# Patient Record
Sex: FEMALE | Race: WHITE
[De-identification: names, ages, dates, MRNs, and addresses within clinical notes are randomized per-mention and may not be internally consistent; named-entity substitution may affect disease eponyms.]

---

## 2018-01-22 ENCOUNTER — HOSPITAL ENCOUNTER (INPATIENT)
Dept: HOSPITAL 12 - SRC | Age: 68
LOS: 6 days | Discharge: OTHER INSTITUTION | DRG: 895 | End: 2018-01-28
Attending: INTERNAL MEDICINE | Admitting: INTERNAL MEDICINE
Payer: COMMERCIAL

## 2018-01-22 VITALS — DIASTOLIC BLOOD PRESSURE: 65 MMHG | SYSTOLIC BLOOD PRESSURE: 141 MMHG

## 2018-01-22 VITALS — DIASTOLIC BLOOD PRESSURE: 56 MMHG | SYSTOLIC BLOOD PRESSURE: 104 MMHG

## 2018-01-22 VITALS — BODY MASS INDEX: 25.3 KG/M2 | HEIGHT: 61 IN | WEIGHT: 134 LBS

## 2018-01-22 DIAGNOSIS — G89.29: ICD-10-CM

## 2018-01-22 DIAGNOSIS — C44.92: ICD-10-CM

## 2018-01-22 DIAGNOSIS — D69.6: ICD-10-CM

## 2018-01-22 DIAGNOSIS — M17.0: ICD-10-CM

## 2018-01-22 DIAGNOSIS — Z90.49: ICD-10-CM

## 2018-01-22 DIAGNOSIS — Z81.8: ICD-10-CM

## 2018-01-22 DIAGNOSIS — E83.42: ICD-10-CM

## 2018-01-22 DIAGNOSIS — M54.5: ICD-10-CM

## 2018-01-22 DIAGNOSIS — F33.2: ICD-10-CM

## 2018-01-22 DIAGNOSIS — Z79.899: ICD-10-CM

## 2018-01-22 DIAGNOSIS — Z91.5: ICD-10-CM

## 2018-01-22 DIAGNOSIS — F51.4: ICD-10-CM

## 2018-01-22 DIAGNOSIS — Z96.651: ICD-10-CM

## 2018-01-22 DIAGNOSIS — F10.232: Primary | ICD-10-CM

## 2018-01-22 DIAGNOSIS — Z86.718: ICD-10-CM

## 2018-01-22 DIAGNOSIS — E07.81: ICD-10-CM

## 2018-01-22 DIAGNOSIS — Z81.1: ICD-10-CM

## 2018-01-22 DIAGNOSIS — C44.91: ICD-10-CM

## 2018-01-22 DIAGNOSIS — E87.3: ICD-10-CM

## 2018-01-22 DIAGNOSIS — I15.9: ICD-10-CM

## 2018-01-22 DIAGNOSIS — G47.50: ICD-10-CM

## 2018-01-22 DIAGNOSIS — E87.1: ICD-10-CM

## 2018-01-22 DIAGNOSIS — E86.0: ICD-10-CM

## 2018-01-22 LAB
ALP SERPL-CCNC: 77 U/L (ref 50–136)
ALT SERPL W/O P-5'-P-CCNC: 44 U/L (ref 14–59)
AMPHETAMINES UR QL SCN>1000 NG/ML: NEGATIVE
AMYLASE SERPL-CCNC: 68 U/L (ref 25–115)
AST SERPL-CCNC: 30 U/L (ref 15–37)
BARBITURATES UR QL SCN: NEGATIVE
BASOPHILS # BLD AUTO: 0 K/UL (ref 0–8)
BASOPHILS NFR BLD AUTO: 0.5 % (ref 0–2)
BILIRUB SERPL-MCNC: 0.4 MG/DL (ref 0.2–1)
BUN SERPL-MCNC: 31 MG/DL (ref 7–18)
CHLORIDE SERPL-SCNC: 99 MMOL/L (ref 98–107)
CO2 SERPL-SCNC: 34 MMOL/L (ref 21–32)
COCAINE UR QL SCN: NEGATIVE
CREAT SERPL-MCNC: 1.2 MG/DL (ref 0.6–1.3)
EOSINOPHIL # BLD AUTO: 0.3 K/UL (ref 0–0.7)
EOSINOPHIL NFR BLD AUTO: 4 % (ref 0–7)
ETHANOL SERPL-MCNC: < 3 MG/DL (ref 0–0)
GLUCOSE SERPL-MCNC: 116 MG/DL (ref 74–106)
HCG UR QL: NEGATIVE
HCT VFR BLD AUTO: 37 % (ref 31.2–41.9)
HGB BLD-MCNC: 12.6 G/DL (ref 10.9–14.3)
LYMPHOCYTES # BLD AUTO: 2.4 K/UL (ref 20–40)
LYMPHOCYTES NFR BLD AUTO: 31.3 % (ref 20.5–51.5)
MAGNESIUM SERPL-MCNC: 2 MG/DL (ref 1.8–2.4)
MCH RBC QN AUTO: 30.9 UUG (ref 24.7–32.8)
MCHC RBC AUTO-ENTMCNC: 34 G/DL (ref 32.3–35.6)
MCV RBC AUTO: 91.2 FL (ref 75.5–95.3)
MONOCYTES # BLD AUTO: 0.9 K/UL (ref 2–10)
MONOCYTES NFR BLD AUTO: 11.8 % (ref 0–11)
NEUTROPHILS # BLD AUTO: 4.1 K/UL (ref 1.8–8.9)
NEUTROPHILS NFR BLD AUTO: 52.4 % (ref 38.5–71.5)
OPIATES UR QL SCN: NEGATIVE
PCP UR QL SCN>25 NG/ML: NEGATIVE
PLATELET # BLD AUTO: 178 K/UL (ref 179–408)
POTASSIUM SERPL-SCNC: 4.8 MMOL/L (ref 3.5–5.1)
RBC # BLD AUTO: 4.06 MIL/UL (ref 3.63–4.92)
THC UR QL SCN>50 NG/ML: NEGATIVE
TSH SERPL DL<=0.005 MIU/L-ACNC: 4.33 MIU/ML (ref 0.36–3.74)
WBC # BLD AUTO: 7.8 K/UL (ref 3.8–11.8)
WS STN SPEC: 6.9 G/DL (ref 6.4–8.2)

## 2018-01-22 PROCEDURE — A4663 DIALYSIS BLOOD PRESSURE CUFF: HCPCS

## 2018-01-22 PROCEDURE — G0480 DRUG TEST DEF 1-7 CLASSES: HCPCS

## 2018-01-22 RX ADMIN — LORAZEPAM SCH MG: 1 TABLET ORAL at 15:09

## 2018-01-22 RX ADMIN — LORAZEPAM SCH MG: 1 TABLET ORAL at 22:09

## 2018-01-22 RX ADMIN — BUPRENORPHINE HYDROCHLORIDE SCH MG: 2 TABLET SUBLINGUAL at 22:16

## 2018-01-22 RX ADMIN — BUPRENORPHINE HYDROCHLORIDE SCH MG: 2 TABLET SUBLINGUAL at 15:10

## 2018-01-22 NOTE — NUR
End Of Shift 

Report given, plan of care reviewed, Pt is in stable condition A&Ox4, VS monitored closely q 
4 hours. Withdrawal symptoms were closely monitored. Initial CIWA 4. Patient encouraged 
adequate PO fluid intake as tolerated. Patient presented with tremors and anxiety during the 
day. Last COWS 5. Per patient, Ativan has been helping her with her withdrawal symptoms. Pt 
ate all of her meals.  No PRN medications given during the day. Patient encouraged to attend 
group therapies/sessions to learn new coping skills to recent relapse, patient denies SI/HI. 
Participated in group and therapy sessions. All needs met and attended

## 2018-01-22 NOTE — NUR
Pre admission note

Pt is a 67 year old female, admitted on 01/22/18 at 1400 for ETOH, Opiate , Dependence and 
medically supervised withdrawals. Pt in mildly intoxicated but is able comprehend and answer 
questions, pt is in stable condition A&Ox4 will continue assessment on unit.

## 2018-01-22 NOTE — NUR
ADMISSION NOTE

Pt is a 67 year old female, admitted on 01/22/18 at 1400 for ETOH, Opiate , Dependence and 
medically supervised withdrawals. Pts skin and body check completed, no contraband found, 
skin is warm dry and intact no wounds lesions or abnormalities noted upon assessment. Pt 
awake, alert, oriented x 4, gait steady, pt is ambulatory without assistance. Pt weights 134 
pounds and his height is 51. Denies any history of seizures. Pt escorted to room 307 where 
the rest of assessment was completed. Pt is primary source of information, consistent, 
speech coherent. Pt brought home medications which have been reconciled. Pt refused  PNA 
Vaccinations stating that she will receive it somewhere else. Pt has a PCP ,  and  
pt denies having a psychiatrist or a psychologist. Pt requested to be full code, regular  
diet on fall and seizure precautions, Pt denies any food or drug allergies. Her last BM was 
yesterday. Pt states that she lives in a house with her family. Pt denies smoking 
cigarettes. Pt denies being admitted to a hospital in the past 30 days, Pt is not a 
candidate for MRSA. Pts initial vital signs are BP: 141/65, Temp: 98.1, Pulse: 89 SPO2: 96% 
RR:18 and and she denies being in pain. Pts initial CIWA was a 4. Pt reports PMH of Anxiety 
Depression, knee replacement surgery, skin cancer, arthritis of BLE. Pts longest sobriety 
lasted 10 years months  which was from [id number]. Pt stated that she does not attend AA 
meetings. Pt reported family history of abuse, her grandfather drank vodka . Pt denies any 
suicidal or homicidal ideations. 

Substance use Hx:



1.ETOH  champagne: Pt reported first drinking when she was 14 years of age, she has been 
drinking a fifth  of champagne daily for the past 3 months, Her last use was today morning 
she drank a few glasses of champagne on the plane ride.

2.Suboxone:  24 mg daily for the past 8 month as prescribed by her MD.  



Rehab history:

None 



Pt cooperative, appears depressed, reports moderate anxiety. Educated on relaxation 
techniques (deep breathing) pt verbalized understanding.  Skin warm and moist from sweat, 
color pink, consistent throughout the body. Eyes PERLLA, tremors noted and felt. Pt denied 
tingling.  All extremities with full ROM. Lung sounds clear bilaterally, no cough present, 
pt denies SOB. Heart rate regular. Cap refill <3 sec. No edema noted. Abdomen soft, round, 
bowel sounds active x 4, non tender. Pt denies urinary difficulties, urine was provided and 
sent to lab. Pt oriented to unit, room, equipment, shown how to use call light, provided 
returned demonstration. Fall precautions and seizure in place. Side rails up x2, call light 
within reach, bed locked in low position. MD contacted and aware of patients condition.  All 
admitting orders have been placed.

## 2018-01-22 NOTE — NUR
START OF SHIFT NOTE 

RECEIVED REPORT FROM DAY SHIFT  NURSE. PATIENT IS A 67 YEAR OLD NEWLY ADMITTED FOR ALCOHOL 
DEPENDENCE. PATIENT WAS PLACED ON  MODIFIED ATIVAN TAPER. PATIENT  REPORTS PMH OF ANXIETY, 
DEPRESSION , KNEE REPLACEMENT , SKIN CANCER AND ARTHRITIS BLE. PATIENT IS ON SUBUTEX NO .  
TAPER, SHE WAS PRESCRIBED SUBOXONE  FOR SKIN CANCER AND ARTHRITIS BLE MAINTENANCE . NO 
SEIZURE HISTORY.  LAST CIWA 5. THIAMINE IM GIVEN.  RECEIVED PATIENT IN THE ROOM. PATIENT 
REPORTS ANXIETY, SWEATING, DRY MOUTH, SLIGHT TREMORS  AND  3/10  GENERALIZED BODY ACHES BUT 
TOLERABLE. SAFETY MEASURES IN PLACE. CALL LIGHT IN REACH. WILL CONTINUE TO MONITOR.

## 2018-01-23 VITALS — SYSTOLIC BLOOD PRESSURE: 92 MMHG | DIASTOLIC BLOOD PRESSURE: 53 MMHG

## 2018-01-23 VITALS — SYSTOLIC BLOOD PRESSURE: 94 MMHG | DIASTOLIC BLOOD PRESSURE: 54 MMHG

## 2018-01-23 VITALS — SYSTOLIC BLOOD PRESSURE: 101 MMHG | DIASTOLIC BLOOD PRESSURE: 52 MMHG

## 2018-01-23 VITALS — SYSTOLIC BLOOD PRESSURE: 97 MMHG | DIASTOLIC BLOOD PRESSURE: 53 MMHG

## 2018-01-23 VITALS — SYSTOLIC BLOOD PRESSURE: 81 MMHG | DIASTOLIC BLOOD PRESSURE: 56 MMHG

## 2018-01-23 VITALS — DIASTOLIC BLOOD PRESSURE: 52 MMHG | SYSTOLIC BLOOD PRESSURE: 102 MMHG

## 2018-01-23 RX ADMIN — THERA TABS SCH UDTAB: TAB at 09:01

## 2018-01-23 RX ADMIN — CITALOPRAM HYDROBROMIDE SCH MG: 20 TABLET, FILM COATED ORAL at 21:40

## 2018-01-23 RX ADMIN — BUPRENORPHINE HYDROCHLORIDE SCH MG: 2 TABLET SUBLINGUAL at 08:59

## 2018-01-23 RX ADMIN — ENOXAPARIN SODIUM SCH MG: 40 INJECTION SUBCUTANEOUS at 09:04

## 2018-01-23 RX ADMIN — Medication SCH EA: at 09:15

## 2018-01-23 RX ADMIN — Medication PRN EA: at 13:10

## 2018-01-23 RX ADMIN — Medication SCH MG: at 09:01

## 2018-01-23 RX ADMIN — LORAZEPAM SCH MG: 1 TABLET ORAL at 15:08

## 2018-01-23 RX ADMIN — FOLIC ACID SCH MG: 1 TABLET ORAL at 09:01

## 2018-01-23 RX ADMIN — BUPRENORPHINE HYDROCHLORIDE SCH MG: 2 TABLET SUBLINGUAL at 15:08

## 2018-01-23 RX ADMIN — BUPRENORPHINE HYDROCHLORIDE SCH MG: 2 TABLET SUBLINGUAL at 21:40

## 2018-01-23 RX ADMIN — LORAZEPAM SCH MG: 1 TABLET ORAL at 21:40

## 2018-01-23 RX ADMIN — LORAZEPAM SCH MG: 1 TABLET ORAL at 09:01

## 2018-01-23 NOTE — NUR
Reassessment

Pt is in room asleep, Right Radial pulse 62, no respiratory depression, no s/sx of w/d,  no 
non-verbal clues of anxiety or agitation present, CIWA 1; Ativan is effective. Will cont to 
monitor the pt.

## 2018-01-23 NOTE — NUR
PRN Medication Administration-Ativan 

Pt is in room sitting on edge of bed with serve tremors observed, moderate anxiety and 
states that she has "fluctuating sweating", V/S stable, no dizziness, no hallucinations or 
delusions noted, CIWA 13; PRN Ativan 2mg given as ordered. Will reassess in 1H.

## 2018-01-23 NOTE — NUR
CIWA DEFERRED 

PATIENT SLEEPING. CIWA DEFERRED. RESPIRATION EVEN AND UNLABORED. SAFETY  MEASURES IN PLACE. 
CALL LIGHT IN REACH. WILL CONTINUE TO MONITOR.

## 2018-01-23 NOTE — NUR
End of Shift

Report to the night nurse: pt is a 66 y/o female here for Etoh r/t 2 bottles of liquor daily 
for 3 months; Modified Ativan taper ordered and started 1/22/18. Pt is a full code, NKA, 
regular diet, fall and seizure precautions ordered. HHx: Anxiety, depression, knee 
replacement, arthritis on BLE's and active Skin Cancer with scheduled Subutex ordered. Pt 
has high VTE so SCD applied as ordered. PRN Ativan 2mg given at 1315 with CIWA 13 r/t severe 
tremors, sweating, anxiety and agitation, then decreased CIWA 1 with slightly moist skin & 
Radial Pulse 62 during sleep with reassessment. V/S stable. PPD test done on Right FA. Last 
CIWA 16 and Dr. Mednez notified that scheduled Ativan and Subutex given as ordered. No 
hallucinations, delusions or suicidal ideations noted during my shift. No HA, or dizziness 
noted. Pt denies chest pain or SOB noted. BM x2 today, no N/V/D noted. New orders for Celexa 
& labs to tomorrow. Pt is currently asleep in room and no respiratory depression noted.  Pt 
is excused from group therapy and activities during my shift.

## 2018-01-23 NOTE — NUR
Start of shift note

Received report from day shift nurse.  Pt is a 66 yo female, A+Ox4, presenting to [facility] for ETOH dependence.  Pt has NKA, is on Full code status, and on Regular diet.  Pt 
is on Fall and Seizure precautions.  Pt has HX of Anxiety, Depression, Knee replacement, 
Arthritis, and skin cancer.  Pt is on modified Ativan taper, tolerated well.  No s/s of 
distress noted at this time.  Respirations even and unlabored.  Will continue to monitor.

## 2018-01-23 NOTE — NUR
Start of Shift

Report from the night nurse: pt is a 66 y/o female here for Etoh r/t 2 bottles of liquor 
daily for 3 months; Modified Ativan taper ordered and started 1/22/18. Pt is a full code, 
NKA, regular diet, fall and seizure precautions ordered. HHx: Anxiety, depression, knee 
replacement, arthritis on BLE's and active Skin Cancer with scheduled Subutex ordered.  V/S 
stable. Pt has high VTE so SCD applied as ordered. Last CIWA 5. No PRN given. Pt is asleep 
in room. Will cont. to monitor the pt.

## 2018-01-23 NOTE — NUR
END OF SHIFT NOTE 

PATIENT SLEPT 8 HOURS. FLUID INTAKE 500 ML. VOIDED X . NO BM. PATIENT IS A 67 YEAR OLD NEWLY 
ADMITTED FOR ALCOHOL DEPENDENCE. PATIENT WAS PLACED ON  MODIFIED ATIVAN TAPER., TOLERATED 
WELL AND NO ADVERSE REACTION. PATIENT REPORTS ANXIETY, SWEATING, DRY MOUTH, SLIGHT TREMORS  
AND  3/10  GENERALIZED BODY ACHES BUT TOLERABLE BEGINNING OF SHIFT. PATIENT DID NOT REQUIRE 
ANY PRN MEDICATION.  SAFETY MEASURES IN PLACE. CALL LIGHT IN REACH. WILL CONTINUE TO 
MONITOR. LAST CIWA 5.

## 2018-01-24 VITALS — SYSTOLIC BLOOD PRESSURE: 90 MMHG | DIASTOLIC BLOOD PRESSURE: 58 MMHG

## 2018-01-24 VITALS — DIASTOLIC BLOOD PRESSURE: 66 MMHG | SYSTOLIC BLOOD PRESSURE: 97 MMHG

## 2018-01-24 VITALS — SYSTOLIC BLOOD PRESSURE: 101 MMHG | DIASTOLIC BLOOD PRESSURE: 72 MMHG

## 2018-01-24 VITALS — DIASTOLIC BLOOD PRESSURE: 71 MMHG | SYSTOLIC BLOOD PRESSURE: 94 MMHG

## 2018-01-24 VITALS — DIASTOLIC BLOOD PRESSURE: 60 MMHG | SYSTOLIC BLOOD PRESSURE: 91 MMHG

## 2018-01-24 VITALS — DIASTOLIC BLOOD PRESSURE: 50 MMHG | SYSTOLIC BLOOD PRESSURE: 88 MMHG

## 2018-01-24 RX ADMIN — THERA TABS SCH UDTAB: TAB at 09:31

## 2018-01-24 RX ADMIN — BUPRENORPHINE HYDROCHLORIDE SCH MG: 2 TABLET SUBLINGUAL at 21:13

## 2018-01-24 RX ADMIN — Medication SCH EA: at 09:32

## 2018-01-24 RX ADMIN — GABAPENTIN SCH MG: 100 CAPSULE ORAL at 21:13

## 2018-01-24 RX ADMIN — ENOXAPARIN SODIUM SCH MG: 40 INJECTION SUBCUTANEOUS at 09:32

## 2018-01-24 RX ADMIN — LORAZEPAM SCH MG: 1 TABLET ORAL at 16:04

## 2018-01-24 RX ADMIN — CITALOPRAM HYDROBROMIDE SCH MG: 20 TABLET, FILM COATED ORAL at 21:13

## 2018-01-24 RX ADMIN — LORAZEPAM SCH MG: 1 TABLET ORAL at 13:13

## 2018-01-24 RX ADMIN — BUPRENORPHINE HYDROCHLORIDE SCH MG: 2 TABLET SUBLINGUAL at 15:11

## 2018-01-24 RX ADMIN — BUPRENORPHINE HYDROCHLORIDE SCH MG: 2 TABLET SUBLINGUAL at 09:32

## 2018-01-24 RX ADMIN — FOLIC ACID SCH MG: 1 TABLET ORAL at 09:31

## 2018-01-24 RX ADMIN — Medication SCH MG: at 09:31

## 2018-01-24 NOTE — NUR
END OF SHIFT NOTE

Patient cont with 5 days Ativan taper tolerating well. During shift patient did not receive 
any  PRN. Vital signs remained WNL. Encourage Po fluids as tolerated. Last CIWA score was 3 
@1600. Skin intact warm and dry to touch. All needs attended, Safety measures in place. 
Patient endorsed to night nurse in stable condition.

## 2018-01-24 NOTE — NUR
Start of shift note

Received report from day shift nurse.  Pt is a 68 yo female, A+Ox4, presenting to [facility] for ETOH dependence.  Pt has NKA, is on Full code status, and on Regular diet.  Pt 
is on Fall and Seizure precautions.  Pt has HX of Anxiety, Depression, Knee replacement, 
Arthritis, and skin cancer.  Pt is on modified Ativan taper, tolerated well.  No s/s of 
distress noted at this time.  Respirations even and unlabored.  Will continue to monitor.

## 2018-01-24 NOTE — NUR
End of Shift(Endorsement to Primary Nurse)

Writer provided report on 67 year old female admitted to [facility] on 1/22/18 for ETOH 
detoxification. Pt is a full code, regular diet and endorses NKDA. PMH to include anxiety, 
depression, skin cancer and arthritis. Pt also dependent on Suboxone, requiring a scheduled 
routine dosing of Subutex. Pt currently on Ativan taper and tolerating well. Last CIWA of 4, 
assessed at 1200t. No PRN medication on this shift. Pt has been isolative to room and 
withdrawn with staff. Makes needs known. Pleasant and polite with staff. Bed in low position 
with wheels locked and side rails up x2. All safety measures in place.

## 2018-01-24 NOTE — NUR
End of shift note

Pt is a 66 yo female, A+Ox4, presenting to [facility] for ETOH dependence.  Pt has 
NKA, is on Full code status, and on Regular diet.  Pt is on Fall and Seizure precautions.  
Pt has HX of Anxiety, Depression, Knee replacement, Arthritis, and skin cancer.  Pt is on 
modified Ativan taper, tolerated well.  No PRN's given throughout shift.  Pt slept for a 
total of 10 HRS.  Last CIWA: 3 @0400.  No s/s of distress noted at this time.  Respirations 
even and unlabored.  Will endorse to day shift nurse.

## 2018-01-24 NOTE — NUR
Start of Shift

Writer received report on 67 year old female admitted to [facility] on 1/22/18 for ETOH 
detoxification. Pt is a full code, regular diet and endorses NKDA. PMH to include anxiety, 
depression, skin cancer and arthritis. Pt also dependent on Suboxone, requiring a scheduled 
routine dosing of Subutex. Pt currently on Ativan taper and tolerating well. Last CIWA of 3, 
assessed at 0400 per report. No PRN medication on NOC. Writer encounters pt resting in bed 
with eyes closed and even and unlabored respirations, with even rise and fall of chest. Bed 
in low position with wheels locked and side rails up x2. All safety measures in place.

## 2018-01-25 VITALS — DIASTOLIC BLOOD PRESSURE: 68 MMHG | SYSTOLIC BLOOD PRESSURE: 95 MMHG

## 2018-01-25 VITALS — SYSTOLIC BLOOD PRESSURE: 111 MMHG | DIASTOLIC BLOOD PRESSURE: 65 MMHG

## 2018-01-25 VITALS — DIASTOLIC BLOOD PRESSURE: 64 MMHG | SYSTOLIC BLOOD PRESSURE: 91 MMHG

## 2018-01-25 VITALS — DIASTOLIC BLOOD PRESSURE: 64 MMHG | SYSTOLIC BLOOD PRESSURE: 120 MMHG

## 2018-01-25 VITALS — SYSTOLIC BLOOD PRESSURE: 106 MMHG | DIASTOLIC BLOOD PRESSURE: 72 MMHG

## 2018-01-25 VITALS — DIASTOLIC BLOOD PRESSURE: 52 MMHG | SYSTOLIC BLOOD PRESSURE: 91 MMHG

## 2018-01-25 LAB
BASOPHILS # BLD AUTO: 0.1 K/UL (ref 0–8)
BASOPHILS NFR BLD AUTO: 0.5 % (ref 0–2)
BUN SERPL-MCNC: 24 MG/DL (ref 7–18)
CHLORIDE SERPL-SCNC: 101 MMOL/L (ref 98–107)
CO2 SERPL-SCNC: 33 MMOL/L (ref 21–32)
CREAT SERPL-MCNC: 0.9 MG/DL (ref 0.6–1.3)
EOSINOPHIL # BLD AUTO: 0.4 K/UL (ref 0–0.7)
EOSINOPHIL NFR BLD AUTO: 4.1 % (ref 0–7)
GLUCOSE SERPL-MCNC: 81 MG/DL (ref 74–106)
HCT VFR BLD AUTO: 39.5 % (ref 31.2–41.9)
HGB BLD-MCNC: 13.3 G/DL (ref 10.9–14.3)
LYMPHOCYTES # BLD AUTO: 3 K/UL (ref 20–40)
LYMPHOCYTES NFR BLD AUTO: 27.9 % (ref 20.5–51.5)
MAGNESIUM SERPL-MCNC: 1.8 MG/DL (ref 1.8–2.4)
MCH RBC QN AUTO: 30.8 UUG (ref 24.7–32.8)
MCHC RBC AUTO-ENTMCNC: 34 G/DL (ref 32.3–35.6)
MCV RBC AUTO: 91.8 FL (ref 75.5–95.3)
MONOCYTES # BLD AUTO: 1 K/UL (ref 2–10)
MONOCYTES NFR BLD AUTO: 9.5 % (ref 0–11)
NEUTROPHILS # BLD AUTO: 6.2 K/UL (ref 1.8–8.9)
NEUTROPHILS NFR BLD AUTO: 58 % (ref 38.5–71.5)
PLATELET # BLD AUTO: 181 K/UL (ref 179–408)
POTASSIUM SERPL-SCNC: 4.8 MMOL/L (ref 3.5–5.1)
RBC # BLD AUTO: 4.31 MIL/UL (ref 3.63–4.92)
TSH SERPL DL<=0.005 MIU/L-ACNC: 1.53 MIU/ML (ref 0.36–3.74)
WBC # BLD AUTO: 10.6 K/UL (ref 3.8–11.8)

## 2018-01-25 RX ADMIN — ENOXAPARIN SODIUM SCH MG: 40 INJECTION SUBCUTANEOUS at 08:41

## 2018-01-25 RX ADMIN — BUPRENORPHINE HYDROCHLORIDE SCH MG: 2 TABLET SUBLINGUAL at 14:02

## 2018-01-25 RX ADMIN — PRAZOSIN HYDROCHLORIDE SCH MG: 1 CAPSULE ORAL at 20:05

## 2018-01-25 RX ADMIN — Medication SCH EA: at 08:42

## 2018-01-25 RX ADMIN — Medication PRN EA: at 08:36

## 2018-01-25 RX ADMIN — Medication SCH MG: at 08:37

## 2018-01-25 RX ADMIN — CITALOPRAM HYDROBROMIDE SCH MG: 20 TABLET, FILM COATED ORAL at 20:05

## 2018-01-25 RX ADMIN — GABAPENTIN SCH MG: 100 CAPSULE ORAL at 08:37

## 2018-01-25 RX ADMIN — BUPRENORPHINE HYDROCHLORIDE SCH MG: 2 TABLET SUBLINGUAL at 20:06

## 2018-01-25 RX ADMIN — FOLIC ACID SCH MG: 1 TABLET ORAL at 08:37

## 2018-01-25 RX ADMIN — THERA TABS SCH UDTAB: TAB at 08:37

## 2018-01-25 RX ADMIN — GABAPENTIN SCH MG: 300 CAPSULE ORAL at 20:06

## 2018-01-25 RX ADMIN — BUPRENORPHINE HYDROCHLORIDE SCH MG: 2 TABLET SUBLINGUAL at 08:37

## 2018-01-25 NOTE — NUR
Start of Shift Notes:

Received endorsement from night nurse. Patient is a 67 year old female admitted for ETOH and 
opiate dependence who was placed on a modified Ativan and Subutex taper as ordered. No 
adverse reactions noted. NKA. FULL CODE. Regular diet. On fall and seizure precautions. 
Alert and verbally responsive. Oriented x 4. Respirations even and unlabored. No SOB noted. 
Skin warm and dry to touch. Abdomen soft and non-distended. BS (+) in all 4 quadrants. No 
complains of N/V/D or constipation noted. Voids independently. Educated patient on her 
current plan of care for the day and her medication regimen. Encouraged oral fluid intake 
and encouraged group participation to learn new skills to prevent relapse.  Will continue to 
monitor throughout the day.

## 2018-01-25 NOTE — NUR
Biotine mouth spray given:

Patient requested for biotine mouth spray administration for dry mouth with immediate relief 
after each spray.

## 2018-01-25 NOTE — NUR
PRN Medication Reassessment 

Patient is noted in bed sleeping. Breathing even and non labored. No signs of restlessness 
or discomfort noted. PRN Benadryl noted to be effective. Will continue to monitor.

## 2018-01-25 NOTE — NUR
Start of Shift 

Patient Received. Patient is in her room, awake, alert and verbally responsive. Breathing 
even and non labored. Per endorsement, patient continues on a modified Ativan taper and 
continues on a maintenance dosing of Subutex for Chronic pain management. No PRN Medications 
administered. Patient noted to be isolative to room and refusing to attend group meetings 
due to feeling increased signs of withdrawal. Last noted CIWA 3. Will continue plan of care 
as ordered.

## 2018-01-25 NOTE — NUR
End of Shift Notes:

Patient continues to be on modified Ativan taper as ordered. No adverse reactions noted. 
Patient is tolerating taper well. On maintenance Subutex as ordered for chronic pain 
management.  VS monitored closely. No significant abnormalities noted. Withdrawal symptoms 
were closely monitored. Initial CIWA 5, patient presented with myalgia, anxiety, agitation, 
sweats and tremors. No S/I or H/I noted. No AV hallucinations noted. Last CIWA 3. Per 
patient, Ativan  has been effective in reducing her withdrawal symptoms. Compliant with care 
and treatment. Tends to be isolative and withdrawn. Group participation and socialization 
encouraged. All needs met and attended. Will continue to monitor closely.

## 2018-01-25 NOTE — NUR
PRN Medication Administration 

Patient is noted verbalizing inability of falling asleep. PRN Benadryl administered as per 
order. Will continue to monitor.

## 2018-01-25 NOTE — NUR
Therapist prompted client about group times. Client stated she does not feel well today but 
will try to attend tomorrow.

## 2018-01-25 NOTE — NUR
Subutex order clarification:

Clarified with Dr. Mendez that patient is on maintenance dose of Subutex for chronic pain 
management. Patient is not on a Subutex taper, however is on a 5-day Ativan taper that 
started on 1/22/2017.

-------------------------------------------------------------------------------

Addendum: 01/25/18 at 1022 by ROSIE SHABAZZ LVN

-------------------------------------------------------------------------------

Per Dr. Mendez,  no need to do COWS at this time.

## 2018-01-26 VITALS — SYSTOLIC BLOOD PRESSURE: 100 MMHG | DIASTOLIC BLOOD PRESSURE: 52 MMHG

## 2018-01-26 VITALS — SYSTOLIC BLOOD PRESSURE: 102 MMHG | DIASTOLIC BLOOD PRESSURE: 60 MMHG

## 2018-01-26 VITALS — SYSTOLIC BLOOD PRESSURE: 116 MMHG | DIASTOLIC BLOOD PRESSURE: 67 MMHG

## 2018-01-26 VITALS — DIASTOLIC BLOOD PRESSURE: 73 MMHG | SYSTOLIC BLOOD PRESSURE: 109 MMHG

## 2018-01-26 VITALS — DIASTOLIC BLOOD PRESSURE: 57 MMHG | SYSTOLIC BLOOD PRESSURE: 108 MMHG

## 2018-01-26 VITALS — SYSTOLIC BLOOD PRESSURE: 98 MMHG | DIASTOLIC BLOOD PRESSURE: 57 MMHG

## 2018-01-26 RX ADMIN — BUPRENORPHINE HYDROCHLORIDE SCH MG: 2 TABLET SUBLINGUAL at 14:09

## 2018-01-26 RX ADMIN — BUPRENORPHINE HYDROCHLORIDE SCH MG: 2 TABLET SUBLINGUAL at 08:49

## 2018-01-26 RX ADMIN — THERA TABS SCH UDTAB: TAB at 08:49

## 2018-01-26 RX ADMIN — FOLIC ACID SCH MG: 1 TABLET ORAL at 08:49

## 2018-01-26 RX ADMIN — CITALOPRAM HYDROBROMIDE SCH MG: 20 TABLET, FILM COATED ORAL at 20:07

## 2018-01-26 RX ADMIN — Medication SCH MG: at 08:49

## 2018-01-26 RX ADMIN — LORAZEPAM SCH MG: 1 TABLET ORAL at 08:49

## 2018-01-26 RX ADMIN — PRAZOSIN HYDROCHLORIDE SCH MG: 1 CAPSULE ORAL at 20:06

## 2018-01-26 RX ADMIN — LORAZEPAM SCH MG: 1 TABLET ORAL at 20:07

## 2018-01-26 RX ADMIN — ENOXAPARIN SODIUM SCH MG: 40 INJECTION SUBCUTANEOUS at 08:51

## 2018-01-26 RX ADMIN — BUPRENORPHINE HYDROCHLORIDE SCH MG: 2 TABLET SUBLINGUAL at 20:06

## 2018-01-26 RX ADMIN — GABAPENTIN SCH MG: 300 CAPSULE ORAL at 20:06

## 2018-01-26 RX ADMIN — GABAPENTIN SCH MG: 300 CAPSULE ORAL at 08:49

## 2018-01-26 RX ADMIN — Medication SCH EA: at 08:54

## 2018-01-26 NOTE — NUR
Start of Shift

Patient Received. Patient is noted in bed sleeping but easily aroused to verbal stimuli. 
Breathing even and non labored. Patient noted not attended last group meeting and patient 
verbalizes "I dont feel up to going right now." Allowed patient to verbalize feelings. 
Encouraged and offered support. Patient continues on a modified Ativan taper and continues 
on a maintenance dosing of Subutex for Chronic pain management. Per endorsement, No PRN 
Medications administered. Last noted CIWA 4. All needs attended to promptly. Will continue 
plan of care as ordered.

## 2018-01-26 NOTE — NUR
End of Shift Notes:

Patient continues to be on modified Ativan taper as ordered. No adverse reactions noted. 
Patient is tolerating taper well. On maintenance Subutex as ordered for chronic pain 
management.  VS monitored closely. No significant abnormalities noted. Withdrawal symptoms 
were closely monitored. Initial CIWA 9, patient presented with myalgia, anxiety, agitation, 
sweats and tremors. No S/I or H/I noted. No AV hallucinations noted. Last CIWA 4. Per 
patient, Ativan  has been effective in reducing her withdrawal symptoms. Compliant with care 
and treatment. Tends to be isolative and withdrawn. Group participation and socialization 
encouraged. All needs met and attended. Will continue to monitor

## 2018-01-26 NOTE — NUR
End of Shift 

Patient is in bed sleeping but easily aroused to verbal stimuli. Breathing even and non 
labored. Patient continues on a modified Ativan taper and continues on a maintenance dosing 
of Subutex for Chronic pain management. Patient received PRN Benadryl for sleep with 
medication noted to be effective. Patient noted to sleep a total of 9 hours. Last noted CIWA 
8. All needs attended to promptly. Will endorse to continue plan of care as ordered.

## 2018-01-26 NOTE — NUR
PRN Medication Administration

Patient is noted verbalizing inability of falling asleep. PRN Benadryl administered with 
routine medications. will continue to monitor.

## 2018-01-26 NOTE — NUR
Start of Shift Notes:

Received endorsement from night nurse. Patient is a 67 year old female admitted for ETOH who 
was placed on a modified Ativan taper and maintenance Subutex dosing for chronic pain 
management. No adverse reactions noted. NKA. FULL CODE. Regular diet. On fall and seizure 
precautions. Alert and verbally responsive. Oriented x 4. Respirations even and unlabored. 
No SOB noted. Skin warm and dry to touch. Abdomen soft and non-distended. BS (+) in all 4 
quadrants. No complains of N/V/D or constipation noted. Voids independently. Educated 
patient on her current plan of care for the day and her medication regimen. Encouraged oral 
fluid intake and encouraged group participation to learn new skills to prevent relapse.  
Will continue to monitor throughout the day.

## 2018-01-27 VITALS — DIASTOLIC BLOOD PRESSURE: 60 MMHG | SYSTOLIC BLOOD PRESSURE: 101 MMHG

## 2018-01-27 VITALS — SYSTOLIC BLOOD PRESSURE: 108 MMHG | DIASTOLIC BLOOD PRESSURE: 66 MMHG

## 2018-01-27 VITALS — DIASTOLIC BLOOD PRESSURE: 59 MMHG | SYSTOLIC BLOOD PRESSURE: 97 MMHG

## 2018-01-27 VITALS — DIASTOLIC BLOOD PRESSURE: 63 MMHG | SYSTOLIC BLOOD PRESSURE: 106 MMHG

## 2018-01-27 VITALS — DIASTOLIC BLOOD PRESSURE: 70 MMHG | SYSTOLIC BLOOD PRESSURE: 118 MMHG

## 2018-01-27 VITALS — DIASTOLIC BLOOD PRESSURE: 65 MMHG | SYSTOLIC BLOOD PRESSURE: 108 MMHG

## 2018-01-27 RX ADMIN — ENOXAPARIN SODIUM SCH MG: 40 INJECTION SUBCUTANEOUS at 09:06

## 2018-01-27 RX ADMIN — BUPRENORPHINE HYDROCHLORIDE SCH MG: 2 TABLET SUBLINGUAL at 14:00

## 2018-01-27 RX ADMIN — GABAPENTIN SCH MG: 300 CAPSULE ORAL at 09:05

## 2018-01-27 RX ADMIN — Medication SCH EA: at 09:10

## 2018-01-27 RX ADMIN — Medication PRN EA: at 21:29

## 2018-01-27 RX ADMIN — FOLIC ACID SCH MG: 1 TABLET ORAL at 09:05

## 2018-01-27 RX ADMIN — GABAPENTIN SCH MG: 300 CAPSULE ORAL at 21:27

## 2018-01-27 RX ADMIN — BUPRENORPHINE HYDROCHLORIDE SCH MG: 2 TABLET SUBLINGUAL at 21:28

## 2018-01-27 RX ADMIN — THERA TABS SCH UDTAB: TAB at 09:05

## 2018-01-27 RX ADMIN — CITALOPRAM HYDROBROMIDE SCH MG: 20 TABLET, FILM COATED ORAL at 21:27

## 2018-01-27 RX ADMIN — Medication SCH MG: at 09:05

## 2018-01-27 RX ADMIN — PRAZOSIN HYDROCHLORIDE SCH MG: 1 CAPSULE ORAL at 21:27

## 2018-01-27 RX ADMIN — BUPRENORPHINE HYDROCHLORIDE SCH MG: 2 TABLET SUBLINGUAL at 09:04

## 2018-01-27 NOTE — NUR
End of Shift 

Patient is in bed sleeping. Breathing even and non labored. No signs of restlessness or 
discomfort noted. Patient continues on a modified Ativan taper and continues on a 
maintenance dosing of Subutex for Chronic pain management. PRN Benadryl administered with 
medication noted to be effective. Patient slept a total of 10 hours. Patient noted to be non 
compliant with group and social activities. Will endorse to continue encourage and educate. 
Last noted CIWA 5. All needs attended to promptly. Will endorse to  continue plan of care as 
ordered.

## 2018-01-27 NOTE — NUR
START OF SHIFT NOTE 

RECEIVED REPORT FROM DAY SHIFT NURSE. PATIENT IS A 67 YEAR OLD FEMALE ADMITTED FOR ETOH 
DEPENDENCE. PATIENT COMPLETED  MODIFIED ATIVAN TAPER. PATIENT IS ON MAINTENANCE DOSING OF 
SUBUTEX FOR CHRONIC PAIN MANAGEMENT. PATIENT IS MEDICALLY CLEARED TO BE DISCHARGE TOMORROW. 
PATIENT DID NOT REQUIRE ANY PRN MEDICATION.  LAST COWS 2.  PATIENT COMPLIANT  WITH 
MEDICATIONS , PATIENT TENDS TO BE ISOLATIVE. RECEIVED PATIENT IN THE ROOM, PATIENT ALERT AND 
ORIENTED X 4. RESPIRATION EVEN AND UNLABORED. NO COMPLAINTS  AT THIS TIME. WILL CONTINUE TO 
MONITOR.

## 2018-01-28 VITALS — SYSTOLIC BLOOD PRESSURE: 127 MMHG | DIASTOLIC BLOOD PRESSURE: 71 MMHG

## 2018-01-28 RX ADMIN — GABAPENTIN SCH MG: 300 CAPSULE ORAL at 08:27

## 2018-01-28 RX ADMIN — Medication SCH MG: at 08:27

## 2018-01-28 RX ADMIN — Medication SCH EA: at 08:27

## 2018-01-28 RX ADMIN — BUPRENORPHINE HYDROCHLORIDE SCH MG: 2 TABLET SUBLINGUAL at 08:28

## 2018-01-28 RX ADMIN — ENOXAPARIN SODIUM SCH MG: 40 INJECTION SUBCUTANEOUS at 08:33

## 2018-01-28 RX ADMIN — THERA TABS SCH UDTAB: TAB at 08:27

## 2018-01-28 RX ADMIN — FOLIC ACID SCH MG: 1 TABLET ORAL at 08:27

## 2018-01-28 NOTE — NUR
DISCHARGE

Patient discharged off the unit at 0910, in stable condition, prior to discharge patient was 
provided with education and teaching regarding all discharge instructions with good verbal 
understanding. Patient VS WNL. no s/sx of withdrawal noted. Patient discharged to [facility], noted self motivated towards sobriety. Patients home medications, prescriptions and 
discharge instructions were placed in personal duffel bag. patient off the unit in able 
condition at 0910.

## 2018-01-28 NOTE — NUR
END OF SHIFT NOTE 

PATIENT SLEPT 8 HOURS. FLUID INTAKE 796 ML. VOIDED X 3. NO BM. PATIENT IS A 67 YEAR OLD 
FEMALE ADMITTED FOR ETOH DEPENDENCE. PATIENT COMPLETED  MODIFIED ATIVAN TAPER. PATIENT IS ON 
MAINTENANCE DOSING OF SUBUTEX FOR CHRONIC PAIN MANAGEMENT. PATIENT IS MEDICALLY CLEARED TO 
BE DISCHARGE TODAY. PATIENT IN THE ROOM MOST OF THE SHIFT.  PATIENT COMPLIANT  WITH 
MEDICATIONS. PATIENT WAS GIVEN HER PRN LATISSE SOLUTION AND BIOTINE.  SAFETY MEASURES IN 
PLACE. WILL CONTINUE TO MONITOR. LAST CIWA 1.

## 2018-01-28 NOTE — NUR
BEGINNING OF SHIFT

Patient endorsement report received from night shift nurse, all pertinent information 
discussed. Patient is a 67 year old female with admitting Dx: ETOH dependence. Patient 
completed modified Ativan taper  as ordered and continues on maintenance dosing of Subutex 
for chronic pain mgmt as per Dr. bustamante. Patient is scheduled to be discharged this 
morning. will educate patient regarding all discharge instructions, noted self motivated 
towards sobriety.  patient received   PRN: biotin and Latisse  during night shift.  patient 
slept for 8 hours, and last ciwa score of: 1. Patient received awake, alert and oriented x4, 
educated regarding plan of care for the day and medication regimen, with good verbal 
understanding. safety measures in place. call light kept with in reach. all needs met and 
rendered, call light kept with in reach, will continue to monitor closely.

## 2018-10-27 ENCOUNTER — HOSPITAL ENCOUNTER (INPATIENT)
Dept: HOSPITAL 12 - SRC | Age: 68
LOS: 6 days | Discharge: OTHER INSTITUTION | DRG: 895 | End: 2018-11-02
Attending: INTERNAL MEDICINE | Admitting: INTERNAL MEDICINE
Payer: COMMERCIAL

## 2018-10-27 VITALS — DIASTOLIC BLOOD PRESSURE: 60 MMHG | SYSTOLIC BLOOD PRESSURE: 143 MMHG

## 2018-10-27 VITALS — BODY MASS INDEX: 23.98 KG/M2 | HEIGHT: 61 IN | WEIGHT: 127 LBS

## 2018-10-27 DIAGNOSIS — E83.42: ICD-10-CM

## 2018-10-27 DIAGNOSIS — Z86.711: ICD-10-CM

## 2018-10-27 DIAGNOSIS — G89.29: ICD-10-CM

## 2018-10-27 DIAGNOSIS — I95.89: ICD-10-CM

## 2018-10-27 DIAGNOSIS — F32.9: ICD-10-CM

## 2018-10-27 DIAGNOSIS — M17.12: ICD-10-CM

## 2018-10-27 DIAGNOSIS — M47.9: ICD-10-CM

## 2018-10-27 DIAGNOSIS — M81.0: ICD-10-CM

## 2018-10-27 DIAGNOSIS — Z87.11: ICD-10-CM

## 2018-10-27 DIAGNOSIS — Z85.828: ICD-10-CM
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
## 2018-10-27 DIAGNOSIS — K21.9: ICD-10-CM

## 2018-10-27 DIAGNOSIS — Z79.899: ICD-10-CM

## 2018-10-27 DIAGNOSIS — F11.23: ICD-10-CM

## 2018-10-27 DIAGNOSIS — Z96.651: ICD-10-CM

## 2018-10-27 DIAGNOSIS — E86.0: ICD-10-CM

## 2018-10-27 DIAGNOSIS — F10.230: Primary | ICD-10-CM

## 2018-10-27 DIAGNOSIS — Z81.1: ICD-10-CM

## 2018-10-27 DIAGNOSIS — F12.90: ICD-10-CM

## 2018-10-27 DIAGNOSIS — Z90.49: ICD-10-CM

## 2018-10-27 DIAGNOSIS — I10: ICD-10-CM

## 2018-10-27 DIAGNOSIS — F43.10: ICD-10-CM

## 2018-10-27 LAB
ALP SERPL-CCNC: 89 U/L (ref 50–136)
ALT SERPL W/O P-5'-P-CCNC: 29 U/L (ref 14–59)
AMPHETAMINES UR QL SCN>1000 NG/ML: NEGATIVE
AMYLASE SERPL-CCNC: 70 U/L (ref 25–115)
AST SERPL-CCNC: 30 U/L (ref 15–37)
BARBITURATES UR QL SCN: NEGATIVE
BASOPHILS # BLD AUTO: 0 K/UL (ref 0–8)
BASOPHILS NFR BLD AUTO: 0.3 % (ref 0–2)
BILIRUB SERPL-MCNC: 0.4 MG/DL (ref 0.2–1)
BUN SERPL-MCNC: 20 MG/DL (ref 7–18)
CHLORIDE SERPL-SCNC: 103 MMOL/L (ref 98–107)
CO2 SERPL-SCNC: 26 MMOL/L (ref 21–32)
COCAINE UR QL SCN: NEGATIVE
CREAT SERPL-MCNC: 1 MG/DL (ref 0.6–1.3)
EOSINOPHIL # BLD AUTO: 0.4 K/UL (ref 0–0.7)
EOSINOPHIL NFR BLD AUTO: 5 % (ref 0–7)
ETHANOL SERPL-MCNC: < 3 MG/DL (ref 0–0)
GLUCOSE SERPL-MCNC: 93 MG/DL (ref 74–106)
HCG UR QL: NEGATIVE
HCT VFR BLD AUTO: 39.2 % (ref 31.2–41.9)
HGB BLD-MCNC: 13.5 G/DL (ref 10.9–14.3)
LIPASE SERPL-CCNC: 108 U/L (ref 73–393)
LYMPHOCYTES # BLD AUTO: 2.6 K/UL (ref 20–40)
LYMPHOCYTES NFR BLD AUTO: 31.2 % (ref 20.5–51.5)
MAGNESIUM SERPL-MCNC: 1.7 MG/DL (ref 1.8–2.4)
MCH RBC QN AUTO: 30.9 UUG (ref 24.7–32.8)
MCHC RBC AUTO-ENTMCNC: 35 G/DL (ref 32.3–35.6)
MCV RBC AUTO: 89.4 FL (ref 75.5–95.3)
MONOCYTES # BLD AUTO: 0.9 K/UL (ref 2–10)
MONOCYTES NFR BLD AUTO: 11.2 % (ref 0–11)
NEUTROPHILS # BLD AUTO: 4.4 K/UL (ref 1.8–8.9)
NEUTROPHILS NFR BLD AUTO: 52.3 % (ref 38.5–71.5)
OPIATES UR QL SCN: NEGATIVE
PCP UR QL SCN>25 NG/ML: NEGATIVE
PLATELET # BLD AUTO: 209 K/UL (ref 179–408)
POTASSIUM SERPL-SCNC: 4.2 MMOL/L (ref 3.5–5.1)
RBC # BLD AUTO: 4.38 MIL/UL (ref 3.63–4.92)
THC UR QL SCN>50 NG/ML: POSITIVE
WBC # BLD AUTO: 8.5 K/UL (ref 3.8–11.8)
WS STN SPEC: 7.1 G/DL (ref 6.4–8.2)

## 2018-10-27 PROCEDURE — HZ2ZZZZ DETOXIFICATION SERVICES FOR SUBSTANCE ABUSE TREATMENT: ICD-10-PCS | Performed by: INTERNAL MEDICINE

## 2018-10-27 PROCEDURE — G0480 DRUG TEST DEF 1-7 CLASSES: HCPCS

## 2018-10-27 RX ADMIN — BUPRENORPHINE HYDROCHLORIDE SCH MG: 2 TABLET SUBLINGUAL at 21:19

## 2018-10-27 RX ADMIN — DIAZEPAM PRN MG: 10 TABLET ORAL at 21:19

## 2018-10-27 NOTE — NUR
Pre-admission assessment

Pt 67 y/o female presenting for medically supervised withdrawal of alcohol (Champagne). Pt 
reports she drinks 750 ml a day for about 3 months. Pt reports she had 360 ml of champagne 
at 11:00 am today. She denies being intoxicated. She denies she is withdrawals, but does c/o 
mild anxiety, headache, mild nausea, she is tearful and has fine tremors. 

Pt reports NKA, and requests to be a FULL CODE.  Vitals- 147/79, , resp 18, T-98.6, 
oxygen saturation 95%. 

Pt reports suicide attempt 2 weeks ago. She took 65 Remeron pills. She did not go to a 
doctor or hospital. She only told her  after it happened. She denies c/o suicidal 
ideations at this time. Pt does have history of involuntary psychiatric admission about 10 
years ago for danger to self and depression.

Pt reports she takes Suboxone 8mg/2mg TID for chronic knee pain. She is concerned about not 
getting her medication tonight or while she is here. She also takes Zantac 150 mg QD, Celexa 
10mg QD, and Vistaril 25 mg PRN Q6H.

Endorsed to PM shift for admission.

## 2018-10-27 NOTE — NUR
ADMISSION NOTE



Pt arrived ambulatory  accompanied by a PCA at 1855.  Pt is a 68 year old female admitted on 
10/27/18 for medically supervised withdrawal from ETOH (Champagne). Pt is full code with 
NKA. She reports a PMHx of anxiety, depression, HTN, GERD, Squamous and basal cell skin 
cancer, osteoporosis and degenerative disc diagnosed 10 years ago. Pt noted to be alert and 
oriented x4. Speech is clear and audible. She is able to answer interview questions. Pt is 
not intoxicated and presents with withdrawal symptoms of anxiety, red tearful eyes, 
headache, nausea, flushed face, difficulty concentrating, chills, and sweats.

 

She denies a history of abuse. She has a PCP named Dr. Linwood Weir. She denies having a 
psychiatrist. She brought several home medications of Celexa 30mg, Remeron 30mg, 
Multivitamins,and Ranitidine 150mg. Medications have been reconciled.



She denies a history of withdrawal induced delirium, overdose or seizures. She reports a 
history of black outs from ETOH at least three times a week. She reports a recent history of 
suicide attempt 2 weeks ago. Pt states " I took about 60+ pills of Remeron and didn't tell 
anyone." She reports that she did not receive medical care. She reports a history of 5150 
ten years ago for depression.



Pt reports she started using ETOH when she was a teenager. " Back then I would drink here 
and there but now I need it to cope with my pain."

This is her second  time in treatment. She reports that she has had two attempts at 
sobriety.  She reports the last time she was sober was from January 2018-June 2018. She 
states " I left [facility] in January 2018,  went to treatment at [facility] for 4 weeks 
and I was able to stay sober for about 5 months. I relapsed in July." She reports her 
longest sobriety is for 10 years from [id number].  

She reports she wants to get sober because, "I'm using alcohol for my pain and that's not 
cool." She states her triggers for relapse and barrier for staying sober is "My knee pain. 
It's just so painful, I use alcohol to cope and deal with it. I had surgery on my right knee 
and now I'm scheduled to have surgery on my left knee." Pt reports she is here to get sober 
because "[facility] actually encouraged me to come back here after they found out I 
wasn't doing well."



Pt states her support system is " my , Cecilia." Pt reports after detox " 
I want to go back to [facility]."  She reports using substances has affected her life 
because " I'm miserable. I have this chronic pain that's causing me to drink." She denies 
any legal consequences from drinking.  She is a college graduate and lives with her 



She describes her current use as:

1. ETOH (champagne) 750mL daily for 3 months at this rate. Last drink: 360mL on 10/27/18 at 
11:00 am 

2. Suboxone 24 mg daily x1 year. Last dose: 8 mg on 10/27/18 at 0500. Pt states " I've been 
using Suboxone to manage my knee pain and I plan to continue taking it at the treatment 
center" 

3. Cannabinoids " I've been using CBD oil for about one week now to help manage my pain"

Pt describes her withdrawal symptoms as: " anxiety, nausea, headache, agitation, chills and 
sweats"



Upon assessment, heart rate is regular.  Denies chest pain or SOB.  PERRLA, breathing is 
even and unlabored, lung sounds clear.  Abdomen is soft and non-distended.  Bowel sounds 
present in all quadrants, last BM 10/27/18.  Pt reports that BM is regular.  Pt's skin is 
warm, dry and intact. Noted with right scab d/t a scratch on right wrist. No bleeding or 
drainage noted. MD aware of pt's admission. Pt is scheduled to start a 5 day Valium taper 
tomorrow (10/28/18). Pt oriented to room and unit.  Safety measures in place.  Will continue 
to monitor.

## 2018-10-27 NOTE — NUR
PRN VALIUM



Pt complains of anxiety, flushed face, fine tremors, and agitation. CIWA:15. PRN Valium 10 
mg administered as ordered. Will monitor effectiveness.

## 2018-10-27 NOTE — NUR
PRN VALIUM REASSESSMENT



PRN medication effective. Pt is lying in bed with eyes closed and is noted to be asleep. 
Breathing is even and unlabored, safety measures in place. Will continue to monitor.

## 2018-10-28 VITALS — SYSTOLIC BLOOD PRESSURE: 130 MMHG | DIASTOLIC BLOOD PRESSURE: 65 MMHG

## 2018-10-28 VITALS — SYSTOLIC BLOOD PRESSURE: 97 MMHG | DIASTOLIC BLOOD PRESSURE: 55 MMHG

## 2018-10-28 VITALS — DIASTOLIC BLOOD PRESSURE: 56 MMHG | SYSTOLIC BLOOD PRESSURE: 107 MMHG

## 2018-10-28 VITALS — SYSTOLIC BLOOD PRESSURE: 169 MMHG | DIASTOLIC BLOOD PRESSURE: 95 MMHG

## 2018-10-28 VITALS — DIASTOLIC BLOOD PRESSURE: 57 MMHG | SYSTOLIC BLOOD PRESSURE: 109 MMHG

## 2018-10-28 LAB — TSH SERPL DL<=0.005 MIU/L-ACNC: 2.29 MIU/ML (ref 0.36–3.74)

## 2018-10-28 RX ADMIN — FOLIC ACID SCH MG: 1 TABLET ORAL at 08:39

## 2018-10-28 RX ADMIN — DIAZEPAM SCH MG: 10 TABLET ORAL at 14:40

## 2018-10-28 RX ADMIN — PRAZOSIN HYDROCHLORIDE SCH MG: 1 CAPSULE ORAL at 20:49

## 2018-10-28 RX ADMIN — DIAZEPAM SCH MG: 10 TABLET ORAL at 20:49

## 2018-10-28 RX ADMIN — BUPRENORPHINE HYDROCHLORIDE SCH MG: 2 TABLET SUBLINGUAL at 20:50

## 2018-10-28 RX ADMIN — DIAZEPAM PRN MG: 10 TABLET ORAL at 12:25

## 2018-10-28 RX ADMIN — THERA TABS SCH UDTAB: TAB at 08:40

## 2018-10-28 RX ADMIN — DIAZEPAM PRN MG: 10 TABLET ORAL at 07:28

## 2018-10-28 RX ADMIN — BUPRENORPHINE HYDROCHLORIDE SCH MG: 2 TABLET SUBLINGUAL at 14:40

## 2018-10-28 RX ADMIN — BUPRENORPHINE HYDROCHLORIDE SCH MG: 2 TABLET SUBLINGUAL at 08:40

## 2018-10-28 RX ADMIN — Medication SCH MG: at 08:39

## 2018-10-28 NOTE — NUR
PRN/ CIWA 16

PRN Valium 10mg PO given for CIWA 16, patient presents with increased anxiety, irritability, 
restlessness and decreased appetite

## 2018-10-28 NOTE — NUR
START OF SHIFT NOTE



Rcvd report form outgoing nurse. Pt is a 69 y/o female A/O to person, place, time, and 
purpose. Pt was admitted for medically supervised withdrawal from ETOH. Pt is on day 1 of a 
5 day Valium taper. Pt is also on maintenance Subutex 8mg TID. Pt has been presenting w/ 
sweats, chills, body aches, tremors, anxiety, depressed and withdrawn mood, flat affect, and 
anxiety. PRN Valium 10mg x2 was given and noted effective by outgoing nurse. Last CIWA 14 
and COWS 8 @ 1600. Call light is within reach. Pt will continue to be monitored and needs  
met.

## 2018-10-28 NOTE — NUR
PRN Valium Reassess

patient is lying in bed reading quietly, she states Valium 10mg PO PRN was effective in 
reducing her anxiety.

## 2018-10-28 NOTE — NUR
Start Of Shift

Patient is a 68 yr old female who was admitted to [facility] on 10/27/18 for a medically 
supervised withdrawal from ETOH , she will start a 5 day Valium taper today, she slept for 8 
hours and last CIWA was 15. Currently she is in bed awake and crying, states she feels so so 
anxious and in a state of almost panic, Valium 10mg PO PRN will be given, CIWA 17, scheduled 
10mg PO Valium will start at 0900 this AM. Patient states she is hungry, string cheese and 
pudding brought to patient, reassurance  and encouragement given.Continue to follow MD plan 
of care.

## 2018-10-28 NOTE — NUR
CIWA DEFERRED



Pt lying in bed with eyes closed and is noted to be asleep. Safety measures in place. Will 
monitor.

## 2018-10-28 NOTE — NUR
END OF SHIFT



Pt is a 68 year old female admitted on 10/27/18 for medically supervised withdrawal from 
ETOH. Pt noted to be alert and oriented x4. She was noted with anxiety, flsuhed face, 
restlessness, agitation, red teary eyes, headache and nausea during the shift. She is 
scheduled to start a 5 day Valium taper today. She did not receive or request PRN 
medications. She slept a total of 8 hrs, Intake: 500mL, Void: x1, BM:0, CIWA:15 at 2100. 
Breathing even and unlabroed, safety measures in place. Endorsed to AM shift.

## 2018-10-28 NOTE — NUR
COWS 8/ CIWA 14

Withdrawal symptoms include: chills/diaphoresis, myalgias and irritability, increased 
anxiety and emotional volatility

## 2018-10-28 NOTE — NUR
PRN Valium

Valium 10mg PO given, patient is crying, trembling, states a sense of panic, CIWA 17

Scheduled 10mg PO Valium will also be given @ 0900

## 2018-10-28 NOTE — NUR
End Of Shift        

Patient is a 68 yr old female who was admitted to [facility] on 10/27/18 for a medically 
supervised withdrawal from ETOH , she started a 5 day Valium taper today and continues on a 
maintenance dose of 24mg SL Subutex per day . PRN medications given on this shift : Valium 
10mg PO x2. Her withdrawal symptoms include restlessness, increased anxiety, tearfulness, 
body aches -specifically left knee. She had a fluid intake of 2387 ML,2 Voids and 0 BM, her 
last COWS was 8 and  CIWA was 14 @ 1600. She has not joined in group today but has isolated 
in her room reading a book. SCD's on for DVT prophylaxis. Continue to follow MD plan of care 
and offer support and encouragement. Endorsed to night shift.

## 2018-10-28 NOTE — NUR
VITALS REFUSED, CIWA DEFERRED



0400 vitals refused. CIWA deferred d/t pt lying in bed with eyes closed noted to be asleep. 
Breathing is even and unlabored, safety measures in place. Will monitor.

## 2018-10-28 NOTE — NUR
CIWA AND COWS ASSESSMENT



CIWA 14 and COWS 8. Pt has been presenting w/ sweats, chills, body aches, tremors, anxiety, 
depressed and withdrawn mood, flat affect, and anxiety. V/S: T:98.2, P:93, RR:14, SPO2:99, 
BP:169/95.

## 2018-10-28 NOTE — NUR
CIWA 15

Patient presents with increased anxiety, tearfulness, knee and right side pain,she has a sad 
depressed affect

Scheduled Valium 10mg PO and Subutex 8mg SL given

## 2018-10-28 NOTE — NUR
COWS 11

Patient presents with generalized body aches, increased anxiety and bilateral tremors

Subutex 8mg SL given

## 2018-10-29 VITALS — SYSTOLIC BLOOD PRESSURE: 91 MMHG | DIASTOLIC BLOOD PRESSURE: 55 MMHG

## 2018-10-29 VITALS — DIASTOLIC BLOOD PRESSURE: 59 MMHG | SYSTOLIC BLOOD PRESSURE: 101 MMHG

## 2018-10-29 VITALS — DIASTOLIC BLOOD PRESSURE: 47 MMHG | SYSTOLIC BLOOD PRESSURE: 89 MMHG

## 2018-10-29 VITALS — DIASTOLIC BLOOD PRESSURE: 50 MMHG | SYSTOLIC BLOOD PRESSURE: 94 MMHG

## 2018-10-29 LAB
BUN SERPL-MCNC: 26 MG/DL (ref 7–18)
CHLORIDE SERPL-SCNC: 104 MMOL/L (ref 98–107)
CO2 SERPL-SCNC: 28 MMOL/L (ref 21–32)
CREAT SERPL-MCNC: 1.2 MG/DL (ref 0.6–1.3)
GLUCOSE SERPL-MCNC: 87 MG/DL (ref 74–106)
MAGNESIUM SERPL-MCNC: 1.8 MG/DL (ref 1.8–2.4)
POTASSIUM SERPL-SCNC: 4.4 MMOL/L (ref 3.5–5.1)

## 2018-10-29 RX ADMIN — THERA TABS SCH UDTAB: TAB at 08:40

## 2018-10-29 RX ADMIN — DIAZEPAM SCH MG: 5 TABLET ORAL at 08:40

## 2018-10-29 RX ADMIN — BUPRENORPHINE HYDROCHLORIDE SCH MG: 2 TABLET SUBLINGUAL at 14:43

## 2018-10-29 RX ADMIN — FOLIC ACID SCH MG: 1 TABLET ORAL at 08:40

## 2018-10-29 RX ADMIN — Medication SCH MG: at 08:40

## 2018-10-29 RX ADMIN — DIAZEPAM SCH MG: 5 TABLET ORAL at 20:30

## 2018-10-29 RX ADMIN — PRAZOSIN HYDROCHLORIDE SCH MG: 1 CAPSULE ORAL at 20:30

## 2018-10-29 RX ADMIN — PANTOPRAZOLE SODIUM SCH MG: 40 TABLET, DELAYED RELEASE ORAL at 06:41

## 2018-10-29 RX ADMIN — DIAZEPAM SCH MG: 5 TABLET ORAL at 12:57

## 2018-10-29 RX ADMIN — DIAZEPAM SCH MG: 5 TABLET ORAL at 16:58

## 2018-10-29 RX ADMIN — BUPRENORPHINE HYDROCHLORIDE SCH MG: 2 TABLET SUBLINGUAL at 20:30

## 2018-10-29 RX ADMIN — BUPRENORPHINE HYDROCHLORIDE SCH MG: 2 TABLET SUBLINGUAL at 08:40

## 2018-10-29 NOTE — NUR
Start Of Shift

Patient is a 68 yr old female who was admitted to [facility] on 10/27/18 for a medically 
supervised withdrawal from ETOH , she has been placed on a 5 day Valium taper and today is 
day 2, she slept for 9 hours and last CIWA was 14 and COWS 8. Currently she is in bed 
asleep, breathing even and unlabored, call light within reach, SCD's on. Continue to follow 
MD plan of care and offer support and encouragement.

## 2018-10-29 NOTE — NUR
COWS 8/ CIWA 13

Withdrawal symptoms include: chills/diaphoresis, myalgias and irritability, increased 
anxiety and emotional volatility

## 2018-10-29 NOTE — NUR
End Of Shift       

Patient is a 68 yr old female who was admitted to [facility] on 10/27/18 for a medically 
supervised withdrawal from ETOH , she has been placed on a 5 day Valium taper  and continues 
on a maintenance dose of 24mg SL Subutex per day .No PRN medications were given on this 
shift . Her withdrawal symptoms include restlessness, increased anxiety, tearfulness, body 
aches -specifically left knee. She had a fluid intake of 1250  ML, 2 Voids and 0 BM, her 
last COWS was 8 and  CIWA was 13 @ 1600. She has not joined in group today but has isolated 
in her room reading a book. SCD's on for DVT prophylaxis. Continue to follow MD plan of care 
and offer support and encouragement. Endorsed to night shift.

## 2018-10-29 NOTE — NUR
START OF SHIFT NOTE



Rcvd report from outgoing nurse. Pt is a 67 y/o female A/O to person, place, time, and 
purpose. Pt was admitted for medically supervised withdrawal from ETOH. Pt is Subutex 
maintenance @ 8mg TID. Pt is on day 2 of a 5 day Valium taper. Pt has been presenting w/ 
anxiety, depressed and withdrawn mood, isolative, flat affect, sweats, chills, and body 
aches. Pt rcvd no PRN medications during previous shift. Last CIWA 13 and COWS 8 @ 1600. 
Call light is within reach. Pt will continue to be monitored and needs met.

## 2018-10-29 NOTE — NUR
CIWA AND COWS ASSESSMENT



CIWA 12 and COWS 8. Pt has been presenting w/ anxiety, depressed and withdrawn mood, 
isolative, flat affect, sweats, chills, tremors, and body aches. V/S: T:98.2, P:80, RR:15, 
SPO2:96, BP:101/59.

## 2018-10-29 NOTE — NUR
COWS 7/ CIWA 13

Withdrawal symptoms include: chills/diaphoresis, myalgias and irritability, increased 
anxiety and emotional volatility

Scheduled Valium 5mg PO given

## 2018-10-29 NOTE — NUR
CIWA AND COWS DEFERRED



Pt is in bed w/ her eyes closed. Pt's respirations are unlabored and even.

## 2018-10-29 NOTE — NUR
END OF SHIFT NOTE



Endorsed pt to oncoming nurse. Pt is a 67 y/o female A/O to person, place, time, and 
purpose. Pt was admitted for medically supervised withdrawal from ETOH. Pt completed day 1 
of a 5 day Valium taper. Pt is also on maintenance Subutex 8mg TID. Pt continues presenting 
w/ sweats, chills, body aches, tremors, anxiety, depressed and withdrawn mood, flat affect, 
and anxiety. Pt denies any S/I or H/I. Pt rcvd no PRN medications during shift.  Pts fluid 
intake was 1855ml and she slept for 9 hrs. Last CIWA 14 and COWS 8 @ [id number]. Call light is 
within reach.

## 2018-10-29 NOTE — NUR
CIWA AND COWS DEFERRED. V/S REFUSED



Pt is in bed w/ her eyes closed. Pt's respirations are unlabored and even.

## 2018-10-30 VITALS — DIASTOLIC BLOOD PRESSURE: 70 MMHG | SYSTOLIC BLOOD PRESSURE: 102 MMHG

## 2018-10-30 VITALS — DIASTOLIC BLOOD PRESSURE: 60 MMHG | SYSTOLIC BLOOD PRESSURE: 100 MMHG

## 2018-10-30 VITALS — DIASTOLIC BLOOD PRESSURE: 59 MMHG | SYSTOLIC BLOOD PRESSURE: 91 MMHG

## 2018-10-30 VITALS — SYSTOLIC BLOOD PRESSURE: 110 MMHG | DIASTOLIC BLOOD PRESSURE: 63 MMHG

## 2018-10-30 PROCEDURE — HZ31ZZZ INDIVIDUAL COUNSELING FOR SUBSTANCE ABUSE TREATMENT, BEHAVIORAL: ICD-10-PCS

## 2018-10-30 RX ADMIN — THERA TABS SCH UDTAB: TAB at 08:11

## 2018-10-30 RX ADMIN — DIAZEPAM SCH MG: 5 TABLET ORAL at 20:54

## 2018-10-30 RX ADMIN — Medication SCH MG: at 08:11

## 2018-10-30 RX ADMIN — BUPRENORPHINE HYDROCHLORIDE SCH MG: 2 TABLET SUBLINGUAL at 20:54

## 2018-10-30 RX ADMIN — DIAZEPAM SCH MG: 5 TABLET ORAL at 08:11

## 2018-10-30 RX ADMIN — BUPRENORPHINE HYDROCHLORIDE SCH MG: 2 TABLET SUBLINGUAL at 15:30

## 2018-10-30 RX ADMIN — PANTOPRAZOLE SODIUM SCH MG: 40 TABLET, DELAYED RELEASE ORAL at 06:48

## 2018-10-30 RX ADMIN — FOLIC ACID SCH MG: 1 TABLET ORAL at 08:11

## 2018-10-30 RX ADMIN — BUPRENORPHINE HYDROCHLORIDE SCH MG: 2 TABLET SUBLINGUAL at 08:11

## 2018-10-30 RX ADMIN — DIAZEPAM SCH MG: 5 TABLET ORAL at 15:30

## 2018-10-30 RX ADMIN — PRAZOSIN HYDROCHLORIDE SCH MG: 1 CAPSULE ORAL at 21:00

## 2018-10-30 NOTE — NUR
Minipress held

Minipress 1 mg PO due at 2100 was held due to the BP is 88/58. will continue to monitor

## 2018-10-30 NOTE — NUR
Start of Shift Note

Received a 67 y/o female px, admitted for medically supervised withdrawal from ETOH and 
Suboxone. Px is also using cannabinoids oil. Px was placed on 5 day Valium taper started on 
10/28/2018 and on Subutex maintenance 8 mg TID. She is tolerating it. Shes tolerating it. 
Last reported COWS 8 and CIWA 13 by AM shift nurse. During the round at 2000, px is awake on 
bed in left side lying position. Px appears anxious and depressed. Few drinks noted on top 
of the bed side table. She states that her anxiety is 5/10 and complains of knee pain and 
LBP of 5/10. Bed on lowest position, side rails up 2x and call light within reach. Well 
continue to monitor

## 2018-10-30 NOTE — NUR
START OF SHIFT

Endorse rcvd from ongoing nurse, client is in room, lying on her back, she sounds asleep, 
easy to arouse. RR 16, even, non-labored. Last CIWA 12 /COWS 8 @ 2000. Client slept 10 hrs. 
Side rails x 2 up/padded. Seizure precautions. Call light within reach. Will continue to 
monitor.

## 2018-10-30 NOTE — NUR
PRN Baclofen and Benadryl

Px received Baclofen 10 mg PO for knee pain and LBP and Benadryl 50 mg PO for insomnia. to 
reassess after an hour

## 2018-10-30 NOTE — NUR
CIWA DEFERRED. V/S REFUSED



Pt is in bed w/ his eyes closed. Pt's respirations are unlabored and even.

## 2018-10-30 NOTE — NUR
CIWA 13 / COWS 8

Client presents with anxious mood, flat affect, flushed facial skin, enlarged pupils, clammy 
skin, generalized body aches, and difficulty concentrating. Non-pharmacological measures 
rendered. Call light within reach.

## 2018-10-30 NOTE — NUR
CIWA 14 / COWS 8

Client is in bed, a/o x 4, She presents with anxious mood, flat affect, flushed facial skin, 
enlarged pupils, clammy skin, generalized body aches, and difficulty concentrating. Schedule 
Valium 5 mg PO, Subutex 4mg SL administered.  Call light within reach.

## 2018-10-30 NOTE — NUR
END OF SHIFT NOTE



Endorsed pt to oncoming nurse. Pt is a 67 y/o female A/O to person, place, time, and 
purpose. Pt was admitted for medically supervised withdrawal from ETOH. Pt is Subutex 
maintenance @ 8mg TID. Pt completed day 2 of a 5 day Valium taper. Pt continues presenting 
w/ anxiety, depressed and withdrawn mood, isolative, flat affect, sweats, chills, tremors, 
and body aches. Pt denies any S/I or H/I. Pt rcvd no PRN medications during shift. Pts 
fluid intake was 500ml and she slept for 10hrs. Last CIWA 12 and COWS 8 @ 2000. Call light 
is within reach.

## 2018-10-30 NOTE — NUR
COWS 8 and CIWA 9

Upon assessment, px appears anxious and depressed. She states that her anxiety is 5/10 and 
complains of knee pain and LBP of 5/10. NE= 93. will continue to monitor

## 2018-10-30 NOTE — NUR
CIWA 13 / COWS 8

Client presents with anxious mood, flat affect, flushed facial skin, enlarged pupils, clammy 
skin,  and difficulty concentrating. Client Valium 5mg PO and Subutex 8mg SL administered. 
Call light within reach.

## 2018-10-30 NOTE — NUR
END OF SHIFT 

Endorse client to incoming nurse, client is in room, a/o x 4, client continues to present 
with anxious mood, flat affect, flushed facial skin, enlarged pupils, clammy skin,  and 
difficulty concentrating. CIWA 13 / COWS 8 @ 1600. Client is on third of 5 day Valium taper, 
and maintenance Subutex 8mg SL TID. Client is not compliant with group therapy due to above 
withdrawal symptoms. Consumes 50% of meals. Adequate PO fluid intake 2355mL, void x 3. Side 
rails x 2 up/padded. Seizure precautions. Call light within reach.

## 2018-10-30 NOTE — NUR
PRN Benadryl and Baclofen reassessment

On assessment. px is still awake lying on bed in left side position. She states that her 
pain improved to 3/10

## 2018-10-30 NOTE — NUR
CIWA AND COWS DEFERRED. V/S REFUSED



Pt is in bed w/ her eyes cloesd. Pt's respirations are unlabored and even.

## 2018-10-30 NOTE — NUR
START OF SHIFT

Endorse rcvd from ongoing nurse, client is in room, lying on her back, she sounds asleep, 
easy to arouse. RR 16, even, non-labored. Last CIWA 12 /COWS 8 @ 2000. Client slept 10 hrs. 
Side rails x 2 up/padded. Seizure precautions. Call light within reach. Will continue to 
monitor.

-------------------------------------------------------------------------------

Addendum: 10/30/18 at 1910 by KASSANDRA MATTHEWS RN

-------------------------------------------------------------------------------

Wrong Time

## 2018-10-31 VITALS — SYSTOLIC BLOOD PRESSURE: 146 MMHG | DIASTOLIC BLOOD PRESSURE: 97 MMHG

## 2018-10-31 VITALS — DIASTOLIC BLOOD PRESSURE: 72 MMHG | SYSTOLIC BLOOD PRESSURE: 109 MMHG

## 2018-10-31 VITALS — SYSTOLIC BLOOD PRESSURE: 100 MMHG | DIASTOLIC BLOOD PRESSURE: 66 MMHG

## 2018-10-31 VITALS — DIASTOLIC BLOOD PRESSURE: 67 MMHG | SYSTOLIC BLOOD PRESSURE: 129 MMHG

## 2018-10-31 VITALS — DIASTOLIC BLOOD PRESSURE: 56 MMHG | SYSTOLIC BLOOD PRESSURE: 89 MMHG

## 2018-10-31 VITALS — SYSTOLIC BLOOD PRESSURE: 91 MMHG | DIASTOLIC BLOOD PRESSURE: 58 MMHG

## 2018-10-31 RX ADMIN — PANTOPRAZOLE SODIUM SCH MG: 40 TABLET, DELAYED RELEASE ORAL at 06:59

## 2018-10-31 RX ADMIN — PRAZOSIN HYDROCHLORIDE SCH MG: 1 CAPSULE ORAL at 20:44

## 2018-10-31 RX ADMIN — FOLIC ACID SCH MG: 1 TABLET ORAL at 08:50

## 2018-10-31 RX ADMIN — Medication SCH MG: at 08:50

## 2018-10-31 RX ADMIN — BUPRENORPHINE HYDROCHLORIDE SCH MG: 2 TABLET SUBLINGUAL at 08:51

## 2018-10-31 RX ADMIN — ONDANSETRON PRN MG: 4 TABLET, ORALLY DISINTEGRATING ORAL at 08:49

## 2018-10-31 RX ADMIN — BUPRENORPHINE HYDROCHLORIDE SCH MG: 2 TABLET SUBLINGUAL at 20:43

## 2018-10-31 RX ADMIN — DIAZEPAM SCH MG: 5 TABLET ORAL at 20:44

## 2018-10-31 RX ADMIN — THERA TABS SCH UDTAB: TAB at 08:50

## 2018-10-31 RX ADMIN — BUPRENORPHINE HYDROCHLORIDE SCH MG: 2 TABLET SUBLINGUAL at 14:31

## 2018-10-31 RX ADMIN — DIAZEPAM SCH MG: 5 TABLET ORAL at 08:50

## 2018-10-31 NOTE — NUR
end of shift note: pt is admitted to [facility] for etoh withdrawal/dependence. but remains on 
suboxone/subutex maintenance of 8 miligrams. pt is tolerating valium and subutex well, pt 
with episode of nausea and vomiting in the beginning of the shift, IM zofran was effective. 
pt was able to tolerate food intake afterwards. pt with increased activity throughout the 
shift  

-------------------------------------------------------------------------------

Addendum: 10/31/18 at 1934 by MARY ANN LABOY RN

-------------------------------------------------------------------------------

last cows 7 and ciwa 11

## 2018-10-31 NOTE — NUR
start of shift note: received pt from night shift nurse, pt is sleeping at this time no pain 
or discomfort, pt is admitted on subutex maintenance dose, and etoh withdrawal/dependence. 
pt's last ciwa 9. will continue to monitor pt for any changes and continue to meet pt's 
needs

## 2018-10-31 NOTE — NUR
PRN re-assessment: Zofran ODT was not effective, pt had 1 episode of emesis, will administer 
IM zofran and offer ginger Ale.

## 2018-10-31 NOTE — NUR
START OF SHIFT NOTE:

This report received for patient, 68 year old female, admitted on 10/27/2018 for medically 
supervised withdrawal from Alcohol (Champagne), Suboxone, and Cannabinoids (CBD oil).  
Patient continues ordered 5 Day Valium Taper started on 10/28/2018, and ordered Subutex 
maintenance - 8 mg TID, which tolerated well.  Withdrawal symptoms will be closely 
monitoring. The patient is alert and oriented x4: Person, Place, Time, Situation, anxious 
mood, and flat affect.  Patient reports NKA, Regular diet, placed on Full Code, Fall and 
Seizures Precautions.  Patient denies seizures and fall  history.  Patient denies history of 
SI/HI.  PMH: Suicide attempt ("2 weeks ago"), Anxiety, Depression, GERD,Osteoporosis,  and 
Degenerative disc.  Suicide Precautions initiated.  Patient denies SI/HI at this time. The 
most recent  COWS=7,CIWA=11 at 1600: Patient noted with anxiety, agitation,  irritation, 
nervousness, nausea, vomiting, bilateral tremors, c/o very mild lightheaded, restlessness, 
sweating, and fatigue.  VSWNL.  Respirations are unlabored and even.  JASON.  Bowel Sounds 
presented in all four quadrants.  Abdomen is soft and non-tender.  Skin is intact, warm, and 
dry to touch.  PRN Zofran 4 mg SL administrated for nausea and vomiting at 0849 was 
ineffective,  and PRN Zofran 4 mg/2 ml  IM administrated for nausea and vomiting at 0956, as 
ordered, was effective, per day shift nurse report.  Encouraged to intake fluids as 
tolerated.  Encouraged to attend groups activities.  Safe and calm environment provided.  
All needs met.  Safety measures in place: Call light within reach, bed is in lowest 
position, and locked, padded bedrails up bilaterally.   Endorsed by outgoing day shift 
nurse.  Will continue to monitor.

## 2018-10-31 NOTE — NUR
COWS/CIWA ASSESSMENT

COWS=11, CIWA=12: Patient noted with anxious mood, flat affect and following withdrawal 
symptoms, such as anxiety, agitation, depression, irritation, nervousness, bilateral 
tremors, restlessness, sweating, and fatigue.  Scheduled and PRN medications will be 
administrated, as ordered.  Encouraged to intake fluids as tolerated.   Safe and calm 
environment provided.  All needs met.  Safety measures in place: Call light within reach, 
bed is in lowest position, and locked, padded bedrails up bilaterally.  Will continue to 
monitor.

## 2018-10-31 NOTE — NUR
PRN administration: pt with complaints of nausea without vomiting, PRN zofran given prior to 
morning medication administration, will re-assess effectiveness of medication.

## 2018-10-31 NOTE — NUR
End of Shift Note

During the shift at 2100, Minipress 1 mg PO was not given due to low BP. At 2210, she 
received Baclofen 10 mg PO for pain and Benadryl 50 mg PO for insomnia. They were effective. 
Px slept for 10 hours. Oral intake is 500 ml, voided 3x with 1x BM. Last COWS 8 and CIWA 9. 
Bed on lowest position, side rails up 2x and call light within reach. Px endorsed to AM 
shift nurse.

## 2018-10-31 NOTE — NUR
PRN BENADRYL PO ADMINISTRATION

PRN Benadryl 50 mg 1 capsule PO administered at 2044 for insomnia, as ordered.  Patient 
tolerated well.  Encouraged to intake fluids as tolerated.  After one hour will be to 
reassess.  Safe and calm environment provided.  All needs met.  Safety measures in place: 
Call light within reach, bed is in lowest position, and locked, padded bedrails up 
bilaterally.  Will continue to monitor.

## 2018-10-31 NOTE — NUR
COWS and CIWA deferred

COWS and CIWA deferred due to the px is asleep, to reassess if the px is awake per doctor's 
order. will continue to monitor

## 2018-10-31 NOTE — NUR
PRN administration: IM zofran was administered, D/T zofran ODT not being effective, will 
reassess effectiveness of medication

## 2018-11-01 VITALS — DIASTOLIC BLOOD PRESSURE: 67 MMHG | SYSTOLIC BLOOD PRESSURE: 116 MMHG

## 2018-11-01 VITALS — SYSTOLIC BLOOD PRESSURE: 89 MMHG | DIASTOLIC BLOOD PRESSURE: 46 MMHG

## 2018-11-01 VITALS — DIASTOLIC BLOOD PRESSURE: 67 MMHG | SYSTOLIC BLOOD PRESSURE: 118 MMHG

## 2018-11-01 VITALS — DIASTOLIC BLOOD PRESSURE: 59 MMHG | SYSTOLIC BLOOD PRESSURE: 90 MMHG

## 2018-11-01 VITALS — SYSTOLIC BLOOD PRESSURE: 90 MMHG | DIASTOLIC BLOOD PRESSURE: 54 MMHG

## 2018-11-01 RX ADMIN — BUPRENORPHINE HYDROCHLORIDE SCH MG: 2 TABLET SUBLINGUAL at 14:46

## 2018-11-01 RX ADMIN — BUPRENORPHINE HYDROCHLORIDE SCH MG: 2 TABLET SUBLINGUAL at 08:26

## 2018-11-01 RX ADMIN — FOLIC ACID SCH MG: 1 TABLET ORAL at 08:25

## 2018-11-01 RX ADMIN — THERA TABS SCH UDTAB: TAB at 08:25

## 2018-11-01 RX ADMIN — ONDANSETRON PRN MG: 4 TABLET, ORALLY DISINTEGRATING ORAL at 06:50

## 2018-11-01 RX ADMIN — PRAZOSIN HYDROCHLORIDE SCH MG: 1 CAPSULE ORAL at 20:20

## 2018-11-01 RX ADMIN — Medication SCH MG: at 08:25

## 2018-11-01 RX ADMIN — BUPRENORPHINE HYDROCHLORIDE SCH MG: 2 TABLET SUBLINGUAL at 20:21

## 2018-11-01 RX ADMIN — PANTOPRAZOLE SODIUM SCH MG: 40 TABLET, DELAYED RELEASE ORAL at 06:45

## 2018-11-01 RX ADMIN — CLONIDINE HYDROCHLORIDE PRN MG: 0.1 TABLET ORAL at 00:15

## 2018-11-01 NOTE — NUR
PRN CLONIDINE PO ADMINISTRATION: 

PRN Clonidine 0.1 mg PO administered at 0015 for anxiety, as ordered.  Patient tolerated 
well.  Encouraged to intake fluids as tolerated.  After one hour will be to reassess.  Safe 
and calm environment provided.  All needs met.  Safety measures in place: Call light within 
reach, bed is in lowest position, and locked, padded bedrails up bilaterally.  Will continue 
to monitor.

## 2018-11-01 NOTE — NUR
START OF SHIFT NOTE:

Endorsed patient is a 68 year old female, completed ordered 5 Day Valium Taper, and 
continues ordered Subutex maintenance - 8 mg TID, ordered for Alcohol(Champagne), Suboxone, 
and Cannabinoids (CBD oil) withdrawal.  Patient tolerated well.  Patient remains compliant 
with treatment, medications, and diet regimen.  Patient scheduled for discharge  tomorrow to 
"[facility]".  The latest  COWS=8,CIWA=10 at 1600: During the day patient c/o 
anxiety, agitation,  irritation, nervousness, bilateral tremors, restlessness, sweating, and 
fatigue.  Withdrawal symptoms will be closely monitoring.  Skin remains intact, warm, and 
dry to touch.  No PRN medications administered during day shift per outgoing day shift nurse 
report.  Encouraged to intake fluids as tolerated.  Encouraged to attend group activities.  
Safe and calm environment provided.  All needs met.  Safety measures in place: Call light 
within reach, bed is in lowest position, and locked, padded bed rails up bilaterally.  
Endorsed by day shift nurse.  Will continue to monitor.

## 2018-11-01 NOTE — NUR
COWS/CIWA ASSESSMENT

COWS=11, CIWA=11: Patient noted anxious, agitated, c/o nervousness, bilateral tremors, 
restlessness, barely sweating, fatigue, and yawning.  PRN Clonidine PO will be administrated 
for anxiety, as ordered.  Encouraged to intake fluids as tolerated.  Safe and calm 
environment provided.  All needs met.  Safety measures in place: Call light within reach, 
bed is in lowest position, and locked, padded bedrails up bilaterally.  Will continue to 
monitor.

## 2018-11-01 NOTE — NUR
COWS/CIWA ASSESSMENT

COWS=13, CIWA=12: The patient noted with anxiety, agitation, barely sweating, body aches, 
irritability, nasal congestion, nervousness, slight bilateral tremors, restlessness, and 
fatigue.  Scheduled and  PRN Medications will be administrating as ordered.  Withdrawal 
symptoms will be closely monitoring.  Encouraged to intake fluids as tolerated.  Safe and 
calm environment provided.  All needs met.  Safety measures in place: Call light within 
reach, bed is in lowest position, and locked, padded bed rails up bilaterally.   Will 
continue to monitor.

## 2018-11-01 NOTE — NUR
PRN RE-ASSESSMENT

The patient is sleeping.  RR:15.  Respirations are unlabored and even. PRN Motrin 600 mg PO 
administered at 2020 for left knee pain:'8/10", and Benadryl 50 mg PO administered for 
insomnia at 2021 were effective.  Safe and calm environment provided.  All needs met.  
Safety measures in place: Call light within reach, bed is in lowest position, and locked, 
padded bedrails up bilaterally.  Will continue to monitor.

## 2018-11-01 NOTE — NUR
End Of Shift         

Patient is a 68 yr old female who was admitted to [facility] on 10/27/18 for a medically 
supervised withdrawal from ETOH , she has completed  a 5 day Valium taper  and continues on 
a maintenance dose of 24mg SL Subutex per day and she will be discharged tomorrow to  
[facility] " . No PRN medications were given on this shift . Her withdrawal symptoms 
include restlessness, increased anxiety, tearfulness, body aches -specifically left knee. 
She had a fluid intake of  900  ML, 2 Voids and 1 BM, her last COWS was 8 and  CIWA was 10 @ 
1600. She has not joined in group today but has isolated in her room reading a book. SCD's 
on for DVT prophylaxis. Continue to follow MD plan of care and offer support and 
encouragement. Endorsed to night shift.

## 2018-11-01 NOTE — NUR
VS REFUSED, CIWA DEFERRED

VS refused and CIWA deferred at 0400 due patient sleeping.  Respirations are unlabored and 
even.  RR:14.  Will be to assess while patient will awake.  Safe and calm environment 
provided.  All needs met.  Safety measures in place: Call light within reach, bed is in 
lowest position, and locked, padded bedrails up bilaterally.  Will continue to monitor.

## 2018-11-01 NOTE — NUR
END OF SHIFT NOTE:

Endorsed patient, 68 year old female, presented for withdrawal from Alcohol (Champagne), 
Suboxone, and Cannabinoids (CBD oil), continues ordered 5 Day Valium Taper started on 
10/28/2018, and ordered Subutex maintenance - 8 mg TID, which tolerated well.   Withdrawal 
symptoms was closely monitored.  The patient is alert and oriented x4.   Last COWS=11, 
CIWA=11 at 0000:  Patient noted anxious, agitated, c/o nervousness, bilateral tremors, 
restlessness, barely sweating, fatigue, and yawning.  PRN Benadryl 50 mg 1 capsule PO 
administered for insomnia at 2044, PRN Clonidine 0.1 mg PO administered at 0015 for anxiety, 
and were effective.  PRN Zofran 4 mg SL administered for nausea at 0650, incoming day shift 
nurse aware, and will to reassess patient at 0750.  The patient slept for 10 hours, intake 
500 ml, voided x1.  Education provided for relaxation techniques.  Encouraged to intake 
fluids as tolerated.  Encouraged to attend groups activities.  Safe and calm environment 
provided.  All needs met.  Safety measures in place: Call light within reach, bed is in 
lowest position, and locked, padded bedrails up bilaterally.  Endorsed to day shift nurse.

## 2018-11-01 NOTE — NUR
COWS 8/ CIWA 10

Withdrawal symptoms include: chills/diaphoresis, myalgias and irritability, increased 
anxiety, irritability and tearfulness at times

## 2018-11-01 NOTE — NUR
Start Of Shift

Patient is a 68 yr old female who was admitted to [facility] on 10/27/18 for a medically 
supervised withdrawal from ETOH , she has been placed on a 5 day Valium taper and today is 
day 5, she slept for 10 hours and last CIWA was 11 and COWS 11. PRN Medications given on PM 
shift: Benadryl, Clonidine and Zofran. Currently she is in bed awake and voices no concerns 
apart from feeling nauseous ( Zofran was given 30 mins ago), call light within reach, SCD's 
on. Continue to follow MD plan of care and offer support and encouragement.

## 2018-11-01 NOTE — NUR
PRN MOTRIN 600 MG PO AND PRN BENADRYL PO ADMINISTRATION: 

PRN Motrin 600 mg PO administered at 2020 for left knee pain:'8/10", and Benadryl 50 mg PO 
administered for insomnia at 2021 per patient demand, as ordered.   Patient tolerated well.  
Re-assessment will be done in one hour.  Encouraged to intake fluids as tolerated.  After 
one hour will be to reassess.  Safe and calm environment provided.  All needs met.  Safety 
measures in place: Call light within reach, bed is in lowest position, and locked, padded 
bedrails up bilaterally.  Will continue to monitor.

## 2018-11-01 NOTE — NUR
COWS 8/ CIWA 10

Withdrawal symptoms include: chills/diaphoresis, myalgias and irritability, increased 
anxiety and tearfulness at times

## 2018-11-01 NOTE — NUR
PRN RE-ASSESSMENT

Patient is sleeping.  RR: 16.  Respirations are even and unlabored.  PRN Clonidine 0.1 mg PO 
administered for anxiety at 0015 was effective.  Safe and calm environment provided.  All 
needs met.  Safety measures in place: Call light within reach, bed is in lowest position, 
and locked, padded bedrails up bilaterally.  Will continue to monitor.

## 2018-11-01 NOTE — NUR
PRN ZOFRAN SL ADMINISTRATION

PRN Zofran 4 mg SL administered for nausea at 0650, and incoming day shift nurse aware, and 
will to reassess patient at 0750.  Safe and calm environment provided.  All needs met.  
Safety measures in place: Call light within reach, bed is in lowest position, and locked, 
padded bedrails up bilaterally.  Endorsed to day shift nurse.

## 2018-11-01 NOTE — NUR
COWS 9 CIWA 15

Patient presents with nausea, increased anxiety, chills, fine tremors, decreased appetite

Zofran 4mg SL was given @ 0645

Scheduled Valium 5mg PO was given

## 2018-11-02 VITALS — DIASTOLIC BLOOD PRESSURE: 54 MMHG | SYSTOLIC BLOOD PRESSURE: 102 MMHG

## 2018-11-02 VITALS — DIASTOLIC BLOOD PRESSURE: 71 MMHG | SYSTOLIC BLOOD PRESSURE: 106 MMHG

## 2018-11-02 VITALS — DIASTOLIC BLOOD PRESSURE: 60 MMHG | SYSTOLIC BLOOD PRESSURE: 104 MMHG

## 2018-11-02 RX ADMIN — BUPRENORPHINE HYDROCHLORIDE SCH MG: 2 TABLET SUBLINGUAL at 09:27

## 2018-11-02 RX ADMIN — THERA TABS SCH UDTAB: TAB at 09:27

## 2018-11-02 RX ADMIN — FOLIC ACID SCH MG: 1 TABLET ORAL at 09:27

## 2018-11-02 RX ADMIN — CLONIDINE HYDROCHLORIDE PRN MG: 0.1 TABLET ORAL at 06:40

## 2018-11-02 RX ADMIN — Medication SCH MG: at 09:27

## 2018-11-02 RX ADMIN — PANTOPRAZOLE SODIUM SCH MG: 40 TABLET, DELAYED RELEASE ORAL at 06:29

## 2018-11-02 RX ADMIN — CLONIDINE HYDROCHLORIDE PRN MG: 0.1 TABLET ORAL at 00:39

## 2018-11-02 NOTE — NUR
VS REFUSED, COWS/CIWA DEFERRED

VS refused and COWS/CIWA deferred at 0400 due patient sleeping.  Respirations are unlabored 
and even.  RR:12.  Assessment will be done and COWS/CIWA scored while patient will awake.  
Safe and calm environment provided.  All needs met.  Safety measures in place: Call light 
within reach, bed is in lowest position, and locked, padded bedrails up bilaterally.  Will 
continue to monitor.

## 2018-11-02 NOTE — NUR
PRN CLONIDINE PO ADMINISTRATION: 

PRN Clonidine 0.1 mg PO administered at 0640 for anxiety, as ordered.  Patient tolerated 
well.  Encouraged to intake fluids as tolerated.  After one hour will be to reassess.  Safe 
and calm environment provided.  All needs met.  Safety measures in place: Call light within 
reach, bed is in lowest position, and locked, padded bedrails up bilaterally.  Will continue 
to monitor.

## 2018-11-02 NOTE — NUR
Discharge Assessment

Pt is A/O x4 and able to make needs known. Slow and pre-occupied thought process, but clear 
with clear speech pattern. Pt with a flat affect and depressed mood. Pt is anxious and 
restless. Pt VS stable and pt is stable for discharge. Last CIWA 6 and COWS 6. Denies SI/HI 
or A/VH. Pt is hopeful for the future and optimistic for recovery. Pt has been isolative to 
room and guarded with staff. Writer educated pt on need for continued sobriety, continued 
follow-up care and need for medication compliance. Writer educted pt on all discharge 
paperwork, including labs, MD notes and medications. Writer educated pt on all discharge 
medications including name, route, timing, dose and indication of all prescribed 
medications. Pt's prescriptions sent electronically to pt's RTC. Pt denies any further 
comments, questions or concerns. Pt was escorted to awaiting car for transportation to pt's 
RTC.

## 2018-11-02 NOTE — NUR
END OF SHIFT NOTE:

This report given for patient, a 68 year old female, completed 5 Day Valium Taper, and 
continues Subutex maintenance - 8 mg TID, for withdrawal from Alcohol (Champagne), Suboxone, 
and Cannabinoids (CBD oil).  The patient tolerated well.  Patient scheduled for discharge  
today to "[facility] RTC".   Initial COWS=10, CIWA=11 at 2000.  COWS=13, CIWA=12 at 
0000. The patient noted with anxious mood and flat affect.  Last COWS=10, CIWA=12 at 0630.  
The patient experienced anxiety, agitation, barely sweating, body aches, irritability, nasal 
congestion, nervousness, slight bilateral tremors, restlessness, and fatigue. Withdrawal 
symptoms was closely monitored.  Respirations are unlabored and even.  Skin is  intact, 
warm, and dry to touch.  PRN Motrin 600 mg PO administered at 2020 for left knee 
pain:'8/10", Benadryl 50 mg PO administered for insomnia at 2021, PRN Clonidine 0.1 mg PO 
administered at 0039 for anxiety, and were effective.  Clonidine 0.1 mg PO administered at 
0640 for anxiety, and endorsed to incoming day shift nurse for re-assessment PRN Clonidine  
effectiveness in one hour.  Patient remains compliant with treatment, medication, and diet 
regimen.  Encouraged to intake fluids as tolerated.  Patient slept for 5 hours, intake 500 
ml, voided x1, stool x1.  Safe and calm environment provided.  All needs met.  Safety 
measures in place: Call light within reach, bed is in lowest position, and locked, padded 
bedrails up bilaterally.  Endorsed to day shift nurse.

## 2018-11-02 NOTE — NUR
CIWA 6/COWS 6

Pt is tremulous, anxious and diaphoretic with nausea and myalgia. Will continue to monitor, 
support and encourage according to plan of care.

## 2018-11-02 NOTE — NUR
COWS/CIWA ASSESSMENT

COWS=10, CIWA=11: Patient presented with anxiety, agitation, fatigue, irritability, nasal 
congestion, nervousness, restlessness, bilateral tremors, and sweating.  PRN Clonidine PO 
will be administrated for anxiety, as ordered.  Encouraged to intake fluids as tolerated.  
Safe and calm environment provided.  All needs met.  Safety measures in place: Call light 
within reach, bed is in lowest position, and locked, padded bedrails up bilaterally.  Will 
continue to monitor.

## 2018-11-02 NOTE — NUR
END OF SHIFT NOTE:

This report given for patient, a 68 year old female, completed 5 Day Valium Taper, and 
continues Subutex maintenance - 8 mg TID - for withdrawal from Alcohol (Champagne), 
Suboxone, and Cannabinoids (CBD oil).  The patient tolerated well.  Patient scheduled for 
discharge  today to "[facility] [place] [place] [facility]".  Initial COWS=10, CIWA=11 at 2000.  COWS=13, 
CIWA=12 at 0000. The patient noted with anxious mood and flat affect.  Last COWS=10, CIWA=12 
at 0630.  The patient experienced anxiety, agitation, barely sweating, body aches, 
irritability, nasal congestion, nervousness, slight bilateral tremors, restlessness, and 
fatigue. Withdrawal symptoms was closely monitored.  Respirations are unlabored and even.  
Skin is  intact, warm, and dry to touch.  PRN Motrin 600 mg PO administered at 2020 for left 
knee pain:'8/10", Benadryl 50 mg PO administered for insomnia at 2021, PRN Clonidine 0.1 mg 
PO administered at 0039 for anxiety, and were effective.  Clonidine 0.1 mg PO administered 
at 0640 for anxiety, and endorsed to incoming day shift nurse for re-assessment PRN 
Clonidine  effectiveness in one hour.  Patient remains compliant with treatment, medication, 
and diet regimen.  Encouraged to intake fluids as tolerated.  Patient slept for 5 hours, 
intake 500 ml, voided x1, stool x1.  Safe and calm environment provided.  All needs met.  
Safety measures in place: Call light within reach, bed is in lowest position, and locked, 
padded bedrails up bilaterally.  Endorsed to day shift nurse.

## 2018-11-02 NOTE — NUR
PRN RE-ASSESSMENT

The patient is sleeping.  Respirations are unlabored and even. RR:14.  PRN Clonidine 0.1 mg 
PO administered at 0039 for anxiety was effective.  Safe and calm environment provided.  All 
needs met.  Safety measures in place: Call light within reach, bed is in lowest position, 
and locked, padded bedrails up bilaterally.  Will continue to monitor.

## 2018-11-02 NOTE — NUR
Start of Shift

Writer received report on 68 year old female admitted to [facility] on 10/27/18 for ETOH and 
Suboxone withdrawals. Pt endorses NKA, full code and regular diet. Endorses PMH of DDD, 
Osteoporosis and GERD. Pt endorses PPH of anxiety and depression. Pt has completed a 5 day 
Valium taper and is discharging on  maintenance dose of Subutex. Last CIWA 12 and COWS 10. 
Pt was administered PRN Motrin(pain), Benadryl(insomnia) and Clonidine(anxiety) x2 on NOC, 
per report. Writer encounters pt in pts room with pt resting with eyes closed. Even and 
unlabored respirations noted. Bed in low position with wheels locked and side rails up x2. 
Will continue to monitor, support and encourage according to plan of care.

## 2018-11-02 NOTE — NUR
PRN CLONIDINE PO ADMINISTRATION: 

PRN Clonidine 0.1 mg PO administered at 0039 for anxiety, as ordered.  Patient tolerated 
well.  Encouraged to intake fluids as tolerated.  Re-assessment will be done in one hour.  
Safe and calm environment provided.  All needs met.  Safety measures in place: Call light 
within reach, bed is in lowest position, and locked, padded bedrails up bilaterally.  Will 
continue to monitor.

## 2023-03-30 NOTE — NUR
PRN RE-ASSESSMENT

Patient is sleeping on her side.  Respirations are even and unlabored.  RR:14.  PRN Benadryl 
50 mg 1 capsule PO administered for insomnia at 2044 was effective.  Safe and calm 
environment provided.  All needs met.  Safety measures in place: Call light within reach, 
bed is in lowest position, and locked, padded bedrails up bilaterally.  Will continue to 
monitor. Hemigard Intro: Due to skin fragility and wound tension, it was decided to use HEMIGARD adhesive retention suture devices to permit a linear closure. The skin was cleaned and dried for a 6cm distance away from the wound. Excessive hair, if present, was removed to allow for adhesion.